# Patient Record
Sex: MALE | Race: WHITE | NOT HISPANIC OR LATINO | URBAN - METROPOLITAN AREA
[De-identification: names, ages, dates, MRNs, and addresses within clinical notes are randomized per-mention and may not be internally consistent; named-entity substitution may affect disease eponyms.]

---

## 2020-01-01 ENCOUNTER — INPATIENT (INPATIENT)
Facility: HOSPITAL | Age: 70
LOS: 3 days | DRG: 823 | End: 2020-08-20
Attending: HOSPITALIST | Admitting: OTOLARYNGOLOGY
Payer: MEDICARE

## 2020-01-01 VITALS
HEART RATE: 103 BPM | DIASTOLIC BLOOD PRESSURE: 70 MMHG | OXYGEN SATURATION: 99 % | SYSTOLIC BLOOD PRESSURE: 150 MMHG | RESPIRATION RATE: 17 BRPM

## 2020-01-01 VITALS — OXYGEN SATURATION: 51 %

## 2020-01-01 DIAGNOSIS — I21.4 NON-ST ELEVATION (NSTEMI) MYOCARDIAL INFARCTION: ICD-10-CM

## 2020-01-01 DIAGNOSIS — E11.65 TYPE 2 DIABETES MELLITUS WITH HYPERGLYCEMIA: ICD-10-CM

## 2020-01-01 DIAGNOSIS — I42.9 CARDIOMYOPATHY, UNSPECIFIED: ICD-10-CM

## 2020-01-01 DIAGNOSIS — B95.61 METHICILLIN SUSCEPTIBLE STAPHYLOCOCCUS AUREUS INFECTION AS THE CAUSE OF DISEASES CLASSIFIED ELSEWHERE: ICD-10-CM

## 2020-01-01 DIAGNOSIS — A41.9 SEPSIS, UNSPECIFIED ORGANISM: ICD-10-CM

## 2020-01-01 DIAGNOSIS — D63.0 ANEMIA IN NEOPLASTIC DISEASE: ICD-10-CM

## 2020-01-01 DIAGNOSIS — H93.19 TINNITUS, UNSPECIFIED EAR: ICD-10-CM

## 2020-01-01 DIAGNOSIS — I33.0 ACUTE AND SUBACUTE INFECTIVE ENDOCARDITIS: ICD-10-CM

## 2020-01-01 DIAGNOSIS — R65.21 SEVERE SEPSIS WITH SEPTIC SHOCK: ICD-10-CM

## 2020-01-01 DIAGNOSIS — R57.0 CARDIOGENIC SHOCK: ICD-10-CM

## 2020-01-01 DIAGNOSIS — C70.1 MALIGNANT NEOPLASM OF SPINAL MENINGES: ICD-10-CM

## 2020-01-01 DIAGNOSIS — G93.49 OTHER ENCEPHALOPATHY: ICD-10-CM

## 2020-01-01 DIAGNOSIS — I25.10 ATHEROSCLEROTIC HEART DISEASE OF NATIVE CORONARY ARTERY WITHOUT ANGINA PECTORIS: ICD-10-CM

## 2020-01-01 DIAGNOSIS — I67.89 OTHER CEREBROVASCULAR DISEASE: ICD-10-CM

## 2020-01-01 DIAGNOSIS — J32.9 CHRONIC SINUSITIS, UNSPECIFIED: ICD-10-CM

## 2020-01-01 DIAGNOSIS — I50.21 ACUTE SYSTOLIC (CONGESTIVE) HEART FAILURE: ICD-10-CM

## 2020-01-01 DIAGNOSIS — R00.0 TACHYCARDIA, UNSPECIFIED: ICD-10-CM

## 2020-01-01 DIAGNOSIS — C41.0 MALIGNANT NEOPLASM OF BONES OF SKULL AND FACE: ICD-10-CM

## 2020-01-01 DIAGNOSIS — E78.5 HYPERLIPIDEMIA, UNSPECIFIED: ICD-10-CM

## 2020-01-01 DIAGNOSIS — B96.4 PROTEUS (MIRABILIS) (MORGANII) AS THE CAUSE OF DISEASES CLASSIFIED ELSEWHERE: ICD-10-CM

## 2020-01-01 DIAGNOSIS — Z78.1 PHYSICAL RESTRAINT STATUS: ICD-10-CM
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
## 2020-01-01 DIAGNOSIS — I11.0 HYPERTENSIVE HEART DISEASE WITH HEART FAILURE: ICD-10-CM

## 2020-01-01 DIAGNOSIS — J96.01 ACUTE RESPIRATORY FAILURE WITH HYPOXIA: ICD-10-CM

## 2020-01-01 DIAGNOSIS — T81.43XA INFECTION FOLLOWING A PROCEDURE, ORGAN AND SPACE SURGICAL SITE, INITIAL ENCOUNTER: ICD-10-CM

## 2020-01-01 DIAGNOSIS — C85.11 UNSPECIFIED B-CELL LYMPHOMA, LYMPH NODES OF HEAD, FACE, AND NECK: ICD-10-CM
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
## 2020-01-01 LAB
-  AMPICILLIN/SULBACTAM: SIGNIFICANT CHANGE UP
-  AMPICILLIN: SIGNIFICANT CHANGE UP
-  CEFAZOLIN: SIGNIFICANT CHANGE UP
-  CEFAZOLIN: SIGNIFICANT CHANGE UP
-  CEFEPIME: SIGNIFICANT CHANGE UP
-  CEFTRIAXONE: SIGNIFICANT CHANGE UP
-  CIPROFLOXACIN: SIGNIFICANT CHANGE UP
-  CLINDAMYCIN: SIGNIFICANT CHANGE UP
-  ERYTHROMYCIN: SIGNIFICANT CHANGE UP
-  GENTAMICIN: SIGNIFICANT CHANGE UP
-  LINEZOLID: SIGNIFICANT CHANGE UP
-  OXACILLIN: SIGNIFICANT CHANGE UP
-  PIPERACILLIN/TAZOBACTAM: SIGNIFICANT CHANGE UP
-  RIFAMPIN: SIGNIFICANT CHANGE UP
-  TOBRAMYCIN: SIGNIFICANT CHANGE UP
-  TRIMETHOPRIM/SULFAMETHOXAZOLE: SIGNIFICANT CHANGE UP
-  TRIMETHOPRIM/SULFAMETHOXAZOLE: SIGNIFICANT CHANGE UP
-  VANCOMYCIN: SIGNIFICANT CHANGE UP
A1C WITH ESTIMATED AVERAGE GLUCOSE RESULT: 9.6 % — HIGH (ref 4–5.6)
A1C WITH ESTIMATED AVERAGE GLUCOSE RESULT: 9.7 % — HIGH (ref 4–5.6)
ALBUMIN SERPL ELPH-MCNC: 2 G/DL — LOW (ref 3.3–5)
ALBUMIN SERPL ELPH-MCNC: 2.1 G/DL — LOW (ref 3.3–5)
ALP SERPL-CCNC: 121 U/L — HIGH (ref 40–120)
ALP SERPL-CCNC: 124 U/L — HIGH (ref 40–120)
ALT FLD-CCNC: 1792 U/L — HIGH (ref 10–45)
ALT FLD-CCNC: 6134 U/L — HIGH (ref 10–45)
ANION GAP SERPL CALC-SCNC: 11 MMOL/L — SIGNIFICANT CHANGE UP (ref 5–17)
ANION GAP SERPL CALC-SCNC: 13 MMOL/L — SIGNIFICANT CHANGE UP (ref 5–17)
ANION GAP SERPL CALC-SCNC: 17 MMOL/L — SIGNIFICANT CHANGE UP (ref 5–17)
ANION GAP SERPL CALC-SCNC: 31 MMOL/L — HIGH (ref 5–17)
ANION GAP SERPL CALC-SCNC: 39 MMOL/L — HIGH (ref 5–17)
APTT BLD: 134.4 SEC — CRITICAL HIGH (ref 27.5–35.5)
APTT BLD: 32.7 SEC — SIGNIFICANT CHANGE UP (ref 27.5–35.5)
APTT BLD: 33.8 SEC — SIGNIFICANT CHANGE UP (ref 27.5–35.5)
APTT BLD: 70.5 SEC — HIGH (ref 27.5–35.5)
AST SERPL-CCNC: 2019 U/L — HIGH (ref 10–40)
AST SERPL-CCNC: >7000 U/L — HIGH (ref 10–40)
BASE EXCESS BLDA CALC-SCNC: -17.9 MMOL/L — LOW (ref -2–3)
BASE EXCESS BLDA CALC-SCNC: -20.8 MMOL/L — LOW (ref -2–3)
BASE EXCESS BLDMV CALC-SCNC: -16.2 MMOL/L — SIGNIFICANT CHANGE UP
BASE EXCESS BLDMV CALC-SCNC: -17.4 MMOL/L — SIGNIFICANT CHANGE UP
BASE EXCESS BLDMV CALC-SCNC: -19.9 MMOL/L — SIGNIFICANT CHANGE UP
BASOPHILS # BLD AUTO: 0.06 K/UL — SIGNIFICANT CHANGE UP (ref 0–0.2)
BASOPHILS # BLD AUTO: 0.38 K/UL — HIGH (ref 0–0.2)
BASOPHILS NFR BLD AUTO: 0.2 % — SIGNIFICANT CHANGE UP (ref 0–2)
BASOPHILS NFR BLD AUTO: 0.9 % — SIGNIFICANT CHANGE UP (ref 0–2)
BILIRUB SERPL-MCNC: 1 MG/DL — SIGNIFICANT CHANGE UP (ref 0.2–1.2)
BILIRUB SERPL-MCNC: 1.1 MG/DL — SIGNIFICANT CHANGE UP (ref 0.2–1.2)
BLD GP AB SCN SERPL QL: NEGATIVE — SIGNIFICANT CHANGE UP
BLD GP AB SCN SERPL QL: NEGATIVE — SIGNIFICANT CHANGE UP
BUN SERPL-MCNC: 10 MG/DL — SIGNIFICANT CHANGE UP (ref 7–23)
BUN SERPL-MCNC: 23 MG/DL — SIGNIFICANT CHANGE UP (ref 7–23)
BUN SERPL-MCNC: 24 MG/DL — HIGH (ref 7–23)
BUN SERPL-MCNC: 25 MG/DL — HIGH (ref 7–23)
BUN SERPL-MCNC: 9 MG/DL — SIGNIFICANT CHANGE UP (ref 7–23)
CALCIUM SERPL-MCNC: 7.5 MG/DL — LOW (ref 8.4–10.5)
CALCIUM SERPL-MCNC: 8.3 MG/DL — LOW (ref 8.4–10.5)
CALCIUM SERPL-MCNC: 8.7 MG/DL — SIGNIFICANT CHANGE UP (ref 8.4–10.5)
CALCIUM SERPL-MCNC: 8.8 MG/DL — SIGNIFICANT CHANGE UP (ref 8.4–10.5)
CALCIUM SERPL-MCNC: 9 MG/DL — SIGNIFICANT CHANGE UP (ref 8.4–10.5)
CHLORIDE SERPL-SCNC: 90 MMOL/L — LOW (ref 96–108)
CHLORIDE SERPL-SCNC: 94 MMOL/L — LOW (ref 96–108)
CHLORIDE SERPL-SCNC: 95 MMOL/L — LOW (ref 96–108)
CK MB CFR SERPL CALC: 26.6 NG/ML — HIGH (ref 0–6.7)
CK MB CFR SERPL CALC: 88.6 NG/ML — HIGH (ref 0–6.7)
CK SERPL-CCNC: 1305 U/L — HIGH (ref 30–200)
CK SERPL-CCNC: 317 U/L — HIGH (ref 30–200)
CO2 SERPL-SCNC: 19 MMOL/L — LOW (ref 22–31)
CO2 SERPL-SCNC: 23 MMOL/L — SIGNIFICANT CHANGE UP (ref 22–31)
CO2 SERPL-SCNC: 26 MMOL/L — SIGNIFICANT CHANGE UP (ref 22–31)
CO2 SERPL-SCNC: 8 MMOL/L — CRITICAL LOW (ref 22–31)
CO2 SERPL-SCNC: 9 MMOL/L — CRITICAL LOW (ref 22–31)
CREAT SERPL-MCNC: 0.82 MG/DL — SIGNIFICANT CHANGE UP (ref 0.5–1.3)
CREAT SERPL-MCNC: 0.86 MG/DL — SIGNIFICANT CHANGE UP (ref 0.5–1.3)
CREAT SERPL-MCNC: 1.46 MG/DL — HIGH (ref 0.5–1.3)
CREAT SERPL-MCNC: 1.97 MG/DL — HIGH (ref 0.5–1.3)
CREAT SERPL-MCNC: 2.14 MG/DL — HIGH (ref 0.5–1.3)
D DIMER BLD IA.RAPID-MCNC: 665 NG/ML DDU — HIGH
D DIMER BLD IA.RAPID-MCNC: HIGH NG/ML DDU
D DIMER BLD IA.RAPID-MCNC: HIGH NG/ML DDU
EOSINOPHIL # BLD AUTO: 0 K/UL — SIGNIFICANT CHANGE UP (ref 0–0.5)
EOSINOPHIL # BLD AUTO: 0.01 K/UL — SIGNIFICANT CHANGE UP (ref 0–0.5)
EOSINOPHIL NFR BLD AUTO: 0 % — SIGNIFICANT CHANGE UP (ref 0–6)
EOSINOPHIL NFR BLD AUTO: 0 % — SIGNIFICANT CHANGE UP (ref 0–6)
ESTIMATED AVERAGE GLUCOSE: 229 MG/DL — HIGH (ref 68–114)
ESTIMATED AVERAGE GLUCOSE: 232 MG/DL — HIGH (ref 68–114)
FIBRINOGEN PPP-MCNC: 265 MG/DL — SIGNIFICANT CHANGE UP (ref 258–438)
GAS PNL BLDA: SIGNIFICANT CHANGE UP
GAS PNL BLDMV: SIGNIFICANT CHANGE UP
GLUCOSE BLDC GLUCOMTR-MCNC: 141 MG/DL — HIGH (ref 70–99)
GLUCOSE BLDC GLUCOMTR-MCNC: 157 MG/DL — HIGH (ref 70–99)
GLUCOSE BLDC GLUCOMTR-MCNC: 186 MG/DL — HIGH (ref 70–99)
GLUCOSE BLDC GLUCOMTR-MCNC: 206 MG/DL — HIGH (ref 70–99)
GLUCOSE BLDC GLUCOMTR-MCNC: 222 MG/DL — HIGH (ref 70–99)
GLUCOSE BLDC GLUCOMTR-MCNC: 235 MG/DL — HIGH (ref 70–99)
GLUCOSE BLDC GLUCOMTR-MCNC: 240 MG/DL — HIGH (ref 70–99)
GLUCOSE BLDC GLUCOMTR-MCNC: 241 MG/DL — HIGH (ref 70–99)
GLUCOSE BLDC GLUCOMTR-MCNC: 249 MG/DL — HIGH (ref 70–99)
GLUCOSE BLDC GLUCOMTR-MCNC: 256 MG/DL — HIGH (ref 70–99)
GLUCOSE BLDC GLUCOMTR-MCNC: 262 MG/DL — HIGH (ref 70–99)
GLUCOSE BLDC GLUCOMTR-MCNC: 297 MG/DL — HIGH (ref 70–99)
GLUCOSE BLDC GLUCOMTR-MCNC: 309 MG/DL — HIGH (ref 70–99)
GLUCOSE BLDC GLUCOMTR-MCNC: 320 MG/DL — HIGH (ref 70–99)
GLUCOSE BLDC GLUCOMTR-MCNC: 324 MG/DL — HIGH (ref 70–99)
GLUCOSE BLDC GLUCOMTR-MCNC: 325 MG/DL — HIGH (ref 70–99)
GLUCOSE BLDC GLUCOMTR-MCNC: 325 MG/DL — HIGH (ref 70–99)
GLUCOSE BLDC GLUCOMTR-MCNC: 326 MG/DL — HIGH (ref 70–99)
GLUCOSE BLDC GLUCOMTR-MCNC: 361 MG/DL — HIGH (ref 70–99)
GLUCOSE BLDC GLUCOMTR-MCNC: 389 MG/DL — HIGH (ref 70–99)
GLUCOSE BLDC GLUCOMTR-MCNC: 396 MG/DL — HIGH (ref 70–99)
GLUCOSE BLDC GLUCOMTR-MCNC: 88 MG/DL — SIGNIFICANT CHANGE UP (ref 70–99)
GLUCOSE SERPL-MCNC: 152 MG/DL — HIGH (ref 70–99)
GLUCOSE SERPL-MCNC: 224 MG/DL — HIGH (ref 70–99)
GLUCOSE SERPL-MCNC: 227 MG/DL — HIGH (ref 70–99)
GLUCOSE SERPL-MCNC: 233 MG/DL — HIGH (ref 70–99)
GLUCOSE SERPL-MCNC: 336 MG/DL — HIGH (ref 70–99)
GRAM STN FLD: SIGNIFICANT CHANGE UP
GRAM STN FLD: SIGNIFICANT CHANGE UP
HCO3 BLDA-SCNC: 7 MMOL/L — CRITICAL LOW (ref 21–28)
HCO3 BLDA-SCNC: 9 MMOL/L — CRITICAL LOW (ref 21–28)
HCO3 BLDMV-SCNC: 10 MMOL/L — SIGNIFICANT CHANGE UP
HCO3 BLDMV-SCNC: 12 MMOL/L — SIGNIFICANT CHANGE UP
HCO3 BLDMV-SCNC: 12 MMOL/L — SIGNIFICANT CHANGE UP
HCT VFR BLD CALC: 23.4 % — LOW (ref 39–50)
HCT VFR BLD CALC: 28.7 % — LOW (ref 39–50)
HCT VFR BLD CALC: 29.4 % — LOW (ref 39–50)
HCT VFR BLD CALC: 29.6 % — LOW (ref 39–50)
HCV AB S/CO SERPL IA: 0.05 S/CO — SIGNIFICANT CHANGE UP
HCV AB SERPL-IMP: SIGNIFICANT CHANGE UP
HGB BLD-MCNC: 7.3 G/DL — LOW (ref 13–17)
HGB BLD-MCNC: 9.4 G/DL — LOW (ref 13–17)
HGB BLD-MCNC: 9.6 G/DL — LOW (ref 13–17)
HGB BLD-MCNC: 9.7 G/DL — LOW (ref 13–17)
IMM GRANULOCYTES NFR BLD AUTO: 1.2 % — SIGNIFICANT CHANGE UP (ref 0–1.5)
INR BLD: 1.33 — HIGH (ref 0.88–1.16)
INR BLD: 1.42 — HIGH (ref 0.88–1.16)
INR BLD: 2.3 — HIGH (ref 0.88–1.16)
LACTATE SERPL-SCNC: 2.6 MMOL/L — HIGH (ref 0.5–2)
LACTATE SERPL-SCNC: 23 MMOL/L — CRITICAL HIGH (ref 0.5–2)
LACTATE SERPL-SCNC: >25 MMOL/L — CRITICAL HIGH (ref 0.5–2)
LACTATE SERPL-SCNC: >25 MMOL/L — CRITICAL HIGH (ref 0.5–2)
LDH SERPL L TO P-CCNC: HIGH U/L (ref 50–242)
LYMPHOCYTES # BLD AUTO: 13.7 % — SIGNIFICANT CHANGE UP (ref 13–44)
LYMPHOCYTES # BLD AUTO: 20 % — SIGNIFICANT CHANGE UP (ref 13–44)
LYMPHOCYTES # BLD AUTO: 3.29 K/UL — SIGNIFICANT CHANGE UP (ref 1–3.3)
LYMPHOCYTES # BLD AUTO: 8.37 K/UL — HIGH (ref 1–3.3)
MAGNESIUM SERPL-MCNC: 1.3 MG/DL — LOW (ref 1.6–2.6)
MAGNESIUM SERPL-MCNC: 1.4 MG/DL — LOW (ref 1.6–2.6)
MAGNESIUM SERPL-MCNC: 1.9 MG/DL — SIGNIFICANT CHANGE UP (ref 1.6–2.6)
MCHC RBC-ENTMCNC: 26.8 PG — LOW (ref 27–34)
MCHC RBC-ENTMCNC: 27 PG — SIGNIFICANT CHANGE UP (ref 27–34)
MCHC RBC-ENTMCNC: 27.7 PG — SIGNIFICANT CHANGE UP (ref 27–34)
MCHC RBC-ENTMCNC: 27.8 PG — SIGNIFICANT CHANGE UP (ref 27–34)
MCHC RBC-ENTMCNC: 31.2 GM/DL — LOW (ref 32–36)
MCHC RBC-ENTMCNC: 32.7 GM/DL — SIGNIFICANT CHANGE UP (ref 32–36)
MCHC RBC-ENTMCNC: 32.8 GM/DL — SIGNIFICANT CHANGE UP (ref 32–36)
MCHC RBC-ENTMCNC: 32.8 GM/DL — SIGNIFICANT CHANGE UP (ref 32–36)
MCV RBC AUTO: 81.8 FL — SIGNIFICANT CHANGE UP (ref 80–100)
MCV RBC AUTO: 82.6 FL — SIGNIFICANT CHANGE UP (ref 80–100)
MCV RBC AUTO: 84.7 FL — SIGNIFICANT CHANGE UP (ref 80–100)
MCV RBC AUTO: 89 FL — SIGNIFICANT CHANGE UP (ref 80–100)
METHOD TYPE: SIGNIFICANT CHANGE UP
METHOD TYPE: SIGNIFICANT CHANGE UP
MONOCYTES # BLD AUTO: 0.71 K/UL — SIGNIFICANT CHANGE UP (ref 0–0.9)
MONOCYTES # BLD AUTO: 1.41 K/UL — HIGH (ref 0–0.9)
MONOCYTES NFR BLD AUTO: 1.7 % — LOW (ref 2–14)
MONOCYTES NFR BLD AUTO: 5.9 % — SIGNIFICANT CHANGE UP (ref 2–14)
MRSA PCR RESULT.: NEGATIVE — SIGNIFICANT CHANGE UP
NEUTROPHILS # BLD AUTO: 19.02 K/UL — HIGH (ref 1.8–7.4)
NEUTROPHILS # BLD AUTO: 32.02 K/UL — HIGH (ref 1.8–7.4)
NEUTROPHILS NFR BLD AUTO: 73 % — SIGNIFICANT CHANGE UP (ref 43–77)
NEUTROPHILS NFR BLD AUTO: 79 % — HIGH (ref 43–77)
NRBC # BLD: 0 /100 WBCS — SIGNIFICANT CHANGE UP (ref 0–0)
NT-PROBNP SERPL-SCNC: 5975 PG/ML — HIGH (ref 0–300)
O2 CT VFR BLD CALC: 39 MMHG — SIGNIFICANT CHANGE UP (ref 30–65)
O2 CT VFR BLD CALC: 40 MMHG — SIGNIFICANT CHANGE UP (ref 30–65)
O2 CT VFR BLD CALC: 42 MMHG — SIGNIFICANT CHANGE UP (ref 30–65)
PCO2 BLDA: 25 MMHG — LOW (ref 35–48)
PCO2 BLDA: 26 MMHG — LOW (ref 35–48)
PCO2 BLDMV: 36 MMHG — SIGNIFICANT CHANGE UP (ref 30–65)
PCO2 BLDMV: 39 MMHG — SIGNIFICANT CHANGE UP (ref 30–65)
PCO2 BLDMV: 44 MMHG — SIGNIFICANT CHANGE UP (ref 30–65)
PH BLDA: 7.09 — CRITICAL LOW (ref 7.35–7.45)
PH BLDA: 7.17 — CRITICAL LOW (ref 7.35–7.45)
PH BLDMV: 7.04 — LOW (ref 7.2–7.45)
PH BLDMV: 7.05 — LOW (ref 7.2–7.45)
PH BLDMV: 7.11 — LOW (ref 7.2–7.45)
PHOSPHATE SERPL-MCNC: 3.3 MG/DL — SIGNIFICANT CHANGE UP (ref 2.5–4.5)
PHOSPHATE SERPL-MCNC: 3.4 MG/DL — SIGNIFICANT CHANGE UP (ref 2.5–4.5)
PHOSPHATE SERPL-MCNC: 9.1 MG/DL — HIGH (ref 2.5–4.5)
PLATELET # BLD AUTO: 175 K/UL — SIGNIFICANT CHANGE UP (ref 150–400)
PLATELET # BLD AUTO: 454 K/UL — HIGH (ref 150–400)
PLATELET # BLD AUTO: 458 K/UL — HIGH (ref 150–400)
PLATELET # BLD AUTO: 507 K/UL — HIGH (ref 150–400)
PO2 BLDA: 110 MMHG — HIGH (ref 83–108)
PO2 BLDA: 66 MMHG — LOW (ref 83–108)
POTASSIUM SERPL-MCNC: 4.7 MMOL/L — SIGNIFICANT CHANGE UP (ref 3.5–5.3)
POTASSIUM SERPL-MCNC: 4.8 MMOL/L — SIGNIFICANT CHANGE UP (ref 3.5–5.3)
POTASSIUM SERPL-MCNC: 5.1 MMOL/L — SIGNIFICANT CHANGE UP (ref 3.5–5.3)
POTASSIUM SERPL-MCNC: 5.4 MMOL/L — HIGH (ref 3.5–5.3)
POTASSIUM SERPL-MCNC: 5.4 MMOL/L — HIGH (ref 3.5–5.3)
POTASSIUM SERPL-SCNC: 4.7 MMOL/L — SIGNIFICANT CHANGE UP (ref 3.5–5.3)
POTASSIUM SERPL-SCNC: 4.8 MMOL/L — SIGNIFICANT CHANGE UP (ref 3.5–5.3)
POTASSIUM SERPL-SCNC: 5.1 MMOL/L — SIGNIFICANT CHANGE UP (ref 3.5–5.3)
POTASSIUM SERPL-SCNC: 5.4 MMOL/L — HIGH (ref 3.5–5.3)
POTASSIUM SERPL-SCNC: 5.4 MMOL/L — HIGH (ref 3.5–5.3)
PROT SERPL-MCNC: 4.3 G/DL — LOW (ref 6–8.3)
PROT SERPL-MCNC: 4.9 G/DL — LOW (ref 6–8.3)
PROTHROM AB SERPL-ACNC: 15.7 SEC — HIGH (ref 10.6–13.6)
PROTHROM AB SERPL-ACNC: 16.8 SEC — HIGH (ref 10.6–13.6)
PROTHROM AB SERPL-ACNC: 26.5 SEC — HIGH (ref 10.6–13.6)
RBC # BLD: 2.63 M/UL — LOW (ref 4.2–5.8)
RBC # BLD: 3.39 M/UL — LOW (ref 4.2–5.8)
RBC # BLD: 3.56 M/UL — LOW (ref 4.2–5.8)
RBC # BLD: 3.62 M/UL — LOW (ref 4.2–5.8)
RBC # FLD: 12.9 % — SIGNIFICANT CHANGE UP (ref 10.3–14.5)
RBC # FLD: 13.1 % — SIGNIFICANT CHANGE UP (ref 10.3–14.5)
RBC # FLD: 13.2 % — SIGNIFICANT CHANGE UP (ref 10.3–14.5)
RBC # FLD: 13.3 % — SIGNIFICANT CHANGE UP (ref 10.3–14.5)
RH IG SCN BLD-IMP: POSITIVE — SIGNIFICANT CHANGE UP
RH IG SCN BLD-IMP: POSITIVE — SIGNIFICANT CHANGE UP
S AUREUS DNA NOSE QL NAA+PROBE: POSITIVE
SAO2 % BLDA: 88 % — LOW (ref 95–100)
SAO2 % BLDA: 96 % — SIGNIFICANT CHANGE UP (ref 95–100)
SAO2 % BLDMV: 45 % — SIGNIFICANT CHANGE UP
SAO2 % BLDMV: 51 % — SIGNIFICANT CHANGE UP
SAO2 % BLDMV: 58 % — SIGNIFICANT CHANGE UP
SARS-COV-2 RNA SPEC QL NAA+PROBE: SIGNIFICANT CHANGE UP
SODIUM SERPL-SCNC: 126 MMOL/L — LOW (ref 135–145)
SODIUM SERPL-SCNC: 130 MMOL/L — LOW (ref 135–145)
SODIUM SERPL-SCNC: 131 MMOL/L — LOW (ref 135–145)
SODIUM SERPL-SCNC: 135 MMOL/L — SIGNIFICANT CHANGE UP (ref 135–145)
SODIUM SERPL-SCNC: 141 MMOL/L — SIGNIFICANT CHANGE UP (ref 135–145)
SPECIMEN SOURCE: SIGNIFICANT CHANGE UP
SPECIMEN SOURCE: SIGNIFICANT CHANGE UP
TROPONIN T SERPL-MCNC: 0.75 NG/ML — CRITICAL HIGH (ref 0–0.01)
TROPONIN T SERPL-MCNC: 9.14 NG/ML — CRITICAL HIGH (ref 0–0.01)
VANCOMYCIN TROUGH SERPL-MCNC: 15.7 UG/ML — SIGNIFICANT CHANGE UP (ref 10–20)
WBC # BLD: 13.34 K/UL — HIGH (ref 3.8–10.5)
WBC # BLD: 13.68 K/UL — HIGH (ref 3.8–10.5)
WBC # BLD: 24.09 K/UL — HIGH (ref 3.8–10.5)
WBC # BLD: 38.47 K/UL — HIGH (ref 3.8–10.5)
WBC # FLD AUTO: 13.34 K/UL — HIGH (ref 3.8–10.5)
WBC # FLD AUTO: 13.68 K/UL — HIGH (ref 3.8–10.5)
WBC # FLD AUTO: 24.09 K/UL — HIGH (ref 3.8–10.5)
WBC # FLD AUTO: 38.47 K/UL — HIGH (ref 3.8–10.5)

## 2020-01-01 PROCEDURE — 88331 PATH CONSLTJ SURG 1 BLK 1SPC: CPT | Mod: 26

## 2020-01-01 PROCEDURE — 88307 TISSUE EXAM BY PATHOLOGIST: CPT | Mod: 26

## 2020-01-01 PROCEDURE — 93010 ELECTROCARDIOGRAM REPORT: CPT

## 2020-01-01 PROCEDURE — 88341 IMHCHEM/IMCYTCHM EA ADD ANTB: CPT | Mod: 26,59

## 2020-01-01 PROCEDURE — 88311 DECALCIFY TISSUE: CPT | Mod: 26

## 2020-01-01 PROCEDURE — 99291 CRITICAL CARE FIRST HOUR: CPT

## 2020-01-01 PROCEDURE — 99223 1ST HOSP IP/OBS HIGH 75: CPT

## 2020-01-01 PROCEDURE — 93306 TTE W/DOPPLER COMPLETE: CPT | Mod: 26

## 2020-01-01 PROCEDURE — 71045 X-RAY EXAM CHEST 1 VIEW: CPT | Mod: 26

## 2020-01-01 PROCEDURE — 99292 CRITICAL CARE ADDL 30 MIN: CPT

## 2020-01-01 PROCEDURE — 93308 TTE F-UP OR LMTD: CPT | Mod: 26

## 2020-01-01 PROCEDURE — 93456 R HRT CORONARY ARTERY ANGIO: CPT | Mod: 26,59

## 2020-01-01 PROCEDURE — 70450 CT HEAD/BRAIN W/O DYE: CPT | Mod: 26

## 2020-01-01 PROCEDURE — 99239 HOSP IP/OBS DSCHRG MGMT >30: CPT

## 2020-01-01 PROCEDURE — 88365 INSITU HYBRIDIZATION (FISH): CPT | Mod: 26

## 2020-01-01 PROCEDURE — 99233 SBSQ HOSP IP/OBS HIGH 50: CPT | Mod: GC

## 2020-01-01 PROCEDURE — 92928 PRQ TCAT PLMT NTRAC ST 1 LES: CPT | Mod: LM

## 2020-01-01 PROCEDURE — 88305 TISSUE EXAM BY PATHOLOGIST: CPT | Mod: 26

## 2020-01-01 PROCEDURE — 70542 MRI ORBIT/FACE/NECK W/DYE: CPT | Mod: 26

## 2020-01-01 PROCEDURE — 88360 TUMOR IMMUNOHISTOCHEM/MANUAL: CPT | Mod: 26

## 2020-01-01 PROCEDURE — 33990 INSJ PERQ VAD L HRT ARTERIAL: CPT

## 2020-01-01 PROCEDURE — 88342 IMHCHEM/IMCYTCHM 1ST ANTB: CPT | Mod: 26,59

## 2020-01-01 PROCEDURE — 88304 TISSUE EXAM BY PATHOLOGIST: CPT | Mod: 26

## 2020-01-01 RX ORDER — HEPARIN SODIUM 5000 [USP'U]/ML
1400 INJECTION INTRAVENOUS; SUBCUTANEOUS
Qty: 25000 | Refills: 0 | Status: DISCONTINUED | OUTPATIENT
Start: 2020-01-01 | End: 2020-01-01

## 2020-01-01 RX ORDER — VANCOMYCIN HCL 1 G
1500 VIAL (EA) INTRAVENOUS ONCE
Refills: 0 | Status: COMPLETED | OUTPATIENT
Start: 2020-01-01 | End: 2020-01-01

## 2020-01-01 RX ORDER — CANGRELOR 50 MG/1
3.89 INJECTION, POWDER, LYOPHILIZED, FOR SOLUTION INTRAVENOUS
Qty: 50 | Refills: 0 | Status: DISCONTINUED | OUTPATIENT
Start: 2020-01-01 | End: 2020-01-01

## 2020-01-01 RX ORDER — DEXTROSE 50 % IN WATER 50 %
15 SYRINGE (ML) INTRAVENOUS ONCE
Refills: 0 | Status: DISCONTINUED | OUTPATIENT
Start: 2020-01-01 | End: 2020-01-01

## 2020-01-01 RX ORDER — TICAGRELOR 90 MG/1
180 TABLET ORAL ONCE
Refills: 0 | Status: COMPLETED | OUTPATIENT
Start: 2020-01-01 | End: 2020-01-01

## 2020-01-01 RX ORDER — SODIUM BICARBONATE 1 MEQ/ML
50 SYRINGE (ML) INTRAVENOUS ONCE
Refills: 0 | Status: COMPLETED | OUTPATIENT
Start: 2020-01-01 | End: 2020-01-01

## 2020-01-01 RX ORDER — IBUPROFEN 200 MG
400 TABLET ORAL EVERY 6 HOURS
Refills: 0 | Status: DISCONTINUED | OUTPATIENT
Start: 2020-01-01 | End: 2020-01-01

## 2020-01-01 RX ORDER — INSULIN HUMAN 100 [IU]/ML
3 INJECTION, SOLUTION SUBCUTANEOUS
Qty: 100 | Refills: 0 | Status: DISCONTINUED | OUTPATIENT
Start: 2020-01-01 | End: 2020-01-01

## 2020-01-01 RX ORDER — LISINOPRIL 2.5 MG/1
10 TABLET ORAL
Refills: 0 | Status: DISCONTINUED | OUTPATIENT
Start: 2020-01-01 | End: 2020-01-01

## 2020-01-01 RX ORDER — HEPARIN SODIUM 5000 [USP'U]/ML
INJECTION INTRAVENOUS; SUBCUTANEOUS
Qty: 12500 | Refills: 0 | Status: DISCONTINUED | OUTPATIENT
Start: 2020-01-01 | End: 2020-01-01

## 2020-01-01 RX ORDER — EPINEPHRINE 0.3 MG/.3ML
2 INJECTION INTRAMUSCULAR; SUBCUTANEOUS
Qty: 4 | Refills: 0 | Status: DISCONTINUED | OUTPATIENT
Start: 2020-01-01 | End: 2020-01-01

## 2020-01-01 RX ORDER — AMIODARONE HYDROCHLORIDE 400 MG/1
1 TABLET ORAL
Qty: 900 | Refills: 0 | Status: DISCONTINUED | OUTPATIENT
Start: 2020-01-01 | End: 2020-01-01

## 2020-01-01 RX ORDER — FENTANYL CITRATE 50 UG/ML
50 INJECTION INTRAVENOUS ONCE
Refills: 0 | Status: DISCONTINUED | OUTPATIENT
Start: 2020-01-01 | End: 2020-01-01

## 2020-01-01 RX ORDER — INSULIN LISPRO 100/ML
VIAL (ML) SUBCUTANEOUS
Refills: 0 | Status: DISCONTINUED | OUTPATIENT
Start: 2020-01-01 | End: 2020-01-01

## 2020-01-01 RX ORDER — FENTANYL CITRATE 50 UG/ML
0.5 INJECTION INTRAVENOUS
Qty: 2500 | Refills: 0 | Status: DISCONTINUED | OUTPATIENT
Start: 2020-01-01 | End: 2020-01-01

## 2020-01-01 RX ORDER — DEXTROSE 50 % IN WATER 50 %
25 SYRINGE (ML) INTRAVENOUS ONCE
Refills: 0 | Status: DISCONTINUED | OUTPATIENT
Start: 2020-01-01 | End: 2020-01-01

## 2020-01-01 RX ORDER — SODIUM CHLORIDE 0.65 %
2 AEROSOL, SPRAY (ML) NASAL
Refills: 0 | Status: DISCONTINUED | OUTPATIENT
Start: 2020-01-01 | End: 2020-01-01

## 2020-01-01 RX ORDER — SODIUM CHLORIDE 9 MG/ML
1000 INJECTION, SOLUTION INTRAVENOUS
Refills: 0 | Status: DISCONTINUED | OUTPATIENT
Start: 2020-01-01 | End: 2020-01-01

## 2020-01-01 RX ORDER — CEFEPIME 1 G/1
2000 INJECTION, POWDER, FOR SOLUTION INTRAMUSCULAR; INTRAVENOUS EVERY 8 HOURS
Refills: 0 | Status: DISCONTINUED | OUTPATIENT
Start: 2020-01-01 | End: 2020-01-01

## 2020-01-01 RX ORDER — FENTANYL CITRATE 50 UG/ML
25 INJECTION INTRAVENOUS ONCE
Refills: 0 | Status: DISCONTINUED | OUTPATIENT
Start: 2020-01-01 | End: 2020-01-01

## 2020-01-01 RX ORDER — NOREPINEPHRINE BITARTRATE/D5W 8 MG/250ML
0.05 PLASTIC BAG, INJECTION (ML) INTRAVENOUS
Qty: 8 | Refills: 0 | Status: DISCONTINUED | OUTPATIENT
Start: 2020-01-01 | End: 2020-01-01

## 2020-01-01 RX ORDER — SODIUM CHLORIDE 9 MG/ML
250 INJECTION INTRAMUSCULAR; INTRAVENOUS; SUBCUTANEOUS ONCE
Refills: 0 | Status: DISCONTINUED | OUTPATIENT
Start: 2020-01-01 | End: 2020-01-01

## 2020-01-01 RX ORDER — SODIUM CHLORIDE 9 MG/ML
500 INJECTION, SOLUTION INTRAVENOUS ONCE
Refills: 0 | Status: DISCONTINUED | OUTPATIENT
Start: 2020-01-01 | End: 2020-01-01

## 2020-01-01 RX ORDER — SODIUM BICARBONATE 1 MEQ/ML
0.41 SYRINGE (ML) INTRAVENOUS
Qty: 150 | Refills: 0 | Status: DISCONTINUED | OUTPATIENT
Start: 2020-01-01 | End: 2020-01-01

## 2020-01-01 RX ORDER — CLOPIDOGREL BISULFATE 75 MG/1
600 TABLET, FILM COATED ORAL ONCE
Refills: 0 | Status: DISCONTINUED | OUTPATIENT
Start: 2020-01-01 | End: 2020-01-01

## 2020-01-01 RX ORDER — MEROPENEM 1 G/30ML
2000 INJECTION INTRAVENOUS EVERY 8 HOURS
Refills: 0 | Status: DISCONTINUED | OUTPATIENT
Start: 2020-01-01 | End: 2020-01-01

## 2020-01-01 RX ORDER — PANTOPRAZOLE SODIUM 20 MG/1
80 TABLET, DELAYED RELEASE ORAL ONCE
Refills: 0 | Status: COMPLETED | OUTPATIENT
Start: 2020-01-01 | End: 2020-01-01

## 2020-01-01 RX ORDER — VANCOMYCIN HCL 1 G
1500 VIAL (EA) INTRAVENOUS EVERY 12 HOURS
Refills: 0 | Status: DISCONTINUED | OUTPATIENT
Start: 2020-01-01 | End: 2020-01-01

## 2020-01-01 RX ORDER — HEPARIN SODIUM 5000 [USP'U]/ML
2500 INJECTION INTRAVENOUS; SUBCUTANEOUS
Qty: 25000 | Refills: 0 | Status: DISCONTINUED | OUTPATIENT
Start: 2020-01-01 | End: 2020-01-01

## 2020-01-01 RX ORDER — LABETALOL HCL 100 MG
10 TABLET ORAL
Refills: 0 | Status: DISCONTINUED | OUTPATIENT
Start: 2020-01-01 | End: 2020-01-01

## 2020-01-01 RX ORDER — HEPARIN SODIUM 5000 [USP'U]/ML
1000 INJECTION INTRAVENOUS; SUBCUTANEOUS
Qty: 25000 | Refills: 0 | Status: DISCONTINUED | OUTPATIENT
Start: 2020-01-01 | End: 2020-01-01

## 2020-01-01 RX ORDER — SODIUM BICARBONATE 1 MEQ/ML
150 SYRINGE (ML) INTRAVENOUS ONCE
Refills: 0 | Status: COMPLETED | OUTPATIENT
Start: 2020-01-01 | End: 2020-01-01

## 2020-01-01 RX ORDER — DIPHENHYDRAMINE HCL 50 MG
25 CAPSULE ORAL AT BEDTIME
Refills: 0 | Status: DISCONTINUED | OUTPATIENT
Start: 2020-01-01 | End: 2020-01-01

## 2020-01-01 RX ORDER — SODIUM BICARBONATE 1 MEQ/ML
2 SYRINGE (ML) INTRAVENOUS
Qty: 150 | Refills: 0 | Status: DISCONTINUED | OUTPATIENT
Start: 2020-01-01 | End: 2020-01-01

## 2020-01-01 RX ORDER — ASPIRIN/CALCIUM CARB/MAGNESIUM 324 MG
81 TABLET ORAL DAILY
Refills: 0 | Status: DISCONTINUED | OUTPATIENT
Start: 2020-01-01 | End: 2020-01-01

## 2020-01-01 RX ORDER — SODIUM CHLORIDE 9 MG/ML
1000 INJECTION, SOLUTION INTRAVENOUS ONCE
Refills: 0 | Status: DISCONTINUED | OUTPATIENT
Start: 2020-01-01 | End: 2020-01-01

## 2020-01-01 RX ORDER — ASPIRIN/CALCIUM CARB/MAGNESIUM 324 MG
325 TABLET ORAL ONCE
Refills: 0 | Status: COMPLETED | OUTPATIENT
Start: 2020-01-01 | End: 2020-01-01

## 2020-01-01 RX ORDER — DIAZEPAM 5 MG
2 TABLET ORAL EVERY 6 HOURS
Refills: 0 | Status: DISCONTINUED | OUTPATIENT
Start: 2020-01-01 | End: 2020-01-01

## 2020-01-01 RX ORDER — PROPOFOL 10 MG/ML
10 INJECTION, EMULSION INTRAVENOUS
Qty: 1000 | Refills: 0 | Status: DISCONTINUED | OUTPATIENT
Start: 2020-01-01 | End: 2020-01-01

## 2020-01-01 RX ORDER — NOREPINEPHRINE BITARTRATE/D5W 8 MG/250ML
0.05 PLASTIC BAG, INJECTION (ML) INTRAVENOUS
Qty: 32 | Refills: 0 | Status: DISCONTINUED | OUTPATIENT
Start: 2020-01-01 | End: 2020-01-01

## 2020-01-01 RX ORDER — SODIUM BICARBONATE 1 MEQ/ML
50 SYRINGE (ML) INTRAVENOUS
Refills: 0 | Status: COMPLETED | OUTPATIENT
Start: 2020-01-01 | End: 2020-01-01

## 2020-01-01 RX ORDER — MEROPENEM 1 G/30ML
2000 INJECTION INTRAVENOUS EVERY 12 HOURS
Refills: 0 | Status: DISCONTINUED | OUTPATIENT
Start: 2020-01-01 | End: 2020-01-01

## 2020-01-01 RX ORDER — SODIUM CHLORIDE 9 MG/ML
1000 INJECTION, SOLUTION INTRAVENOUS ONCE
Refills: 0 | Status: COMPLETED | OUTPATIENT
Start: 2020-01-01 | End: 2020-01-01

## 2020-01-01 RX ORDER — ACETAMINOPHEN 500 MG
650 TABLET ORAL EVERY 6 HOURS
Refills: 0 | Status: DISCONTINUED | OUTPATIENT
Start: 2020-01-01 | End: 2020-01-01

## 2020-01-01 RX ORDER — CHLORHEXIDINE GLUCONATE 213 G/1000ML
15 SOLUTION TOPICAL EVERY 12 HOURS
Refills: 0 | Status: DISCONTINUED | OUTPATIENT
Start: 2020-01-01 | End: 2020-01-01

## 2020-01-01 RX ORDER — VASOPRESSIN 20 [USP'U]/ML
0.04 INJECTION INTRAVENOUS
Qty: 50 | Refills: 0 | Status: DISCONTINUED | OUTPATIENT
Start: 2020-01-01 | End: 2020-01-01

## 2020-01-01 RX ORDER — TRAMADOL HYDROCHLORIDE 50 MG/1
50 TABLET ORAL EVERY 6 HOURS
Refills: 0 | Status: DISCONTINUED | OUTPATIENT
Start: 2020-01-01 | End: 2020-01-01

## 2020-01-01 RX ORDER — METRONIDAZOLE 500 MG
500 TABLET ORAL EVERY 8 HOURS
Refills: 0 | Status: DISCONTINUED | OUTPATIENT
Start: 2020-01-01 | End: 2020-01-01

## 2020-01-01 RX ORDER — PANTOPRAZOLE SODIUM 20 MG/1
40 TABLET, DELAYED RELEASE ORAL
Refills: 0 | Status: DISCONTINUED | OUTPATIENT
Start: 2020-01-01 | End: 2020-01-01

## 2020-01-01 RX ORDER — INSULIN GLARGINE 100 [IU]/ML
10 INJECTION, SOLUTION SUBCUTANEOUS AT BEDTIME
Refills: 0 | Status: DISCONTINUED | OUTPATIENT
Start: 2020-01-01 | End: 2020-01-01

## 2020-01-01 RX ORDER — ONDANSETRON 8 MG/1
4 TABLET, FILM COATED ORAL EVERY 6 HOURS
Refills: 0 | Status: DISCONTINUED | OUTPATIENT
Start: 2020-01-01 | End: 2020-01-01

## 2020-01-01 RX ORDER — LANOLIN ALCOHOL/MO/W.PET/CERES
1 CREAM (GRAM) TOPICAL AT BEDTIME
Refills: 0 | Status: DISCONTINUED | OUTPATIENT
Start: 2020-01-01 | End: 2020-01-01

## 2020-01-01 RX ORDER — CHLORHEXIDINE GLUCONATE 213 G/1000ML
1 SOLUTION TOPICAL
Refills: 0 | Status: DISCONTINUED | OUTPATIENT
Start: 2020-01-01 | End: 2020-01-01

## 2020-01-01 RX ORDER — VASOPRESSIN 20 [USP'U]/ML
0 INJECTION INTRAVENOUS
Qty: 50 | Refills: 0 | Status: DISCONTINUED | OUTPATIENT
Start: 2020-01-01 | End: 2020-01-01

## 2020-01-01 RX ORDER — TICAGRELOR 90 MG/1
90 TABLET ORAL EVERY 12 HOURS
Refills: 0 | Status: DISCONTINUED | OUTPATIENT
Start: 2020-01-01 | End: 2020-01-01

## 2020-01-01 RX ORDER — MEROPENEM 1 G/30ML
INJECTION INTRAVENOUS
Refills: 0 | Status: DISCONTINUED | OUTPATIENT
Start: 2020-01-01 | End: 2020-01-01

## 2020-01-01 RX ORDER — OXYCODONE HYDROCHLORIDE 5 MG/1
5 TABLET ORAL EVERY 4 HOURS
Refills: 0 | Status: DISCONTINUED | OUTPATIENT
Start: 2020-01-01 | End: 2020-01-01

## 2020-01-01 RX ORDER — SODIUM BICARBONATE 1 MEQ/ML
50 SYRINGE (ML) INTRAVENOUS ONCE
Refills: 0 | Status: DISCONTINUED | OUTPATIENT
Start: 2020-01-01 | End: 2020-01-01

## 2020-01-01 RX ORDER — MAGNESIUM SULFATE 500 MG/ML
1 VIAL (ML) INJECTION ONCE
Refills: 0 | Status: COMPLETED | OUTPATIENT
Start: 2020-01-01 | End: 2020-01-01

## 2020-01-01 RX ORDER — EPINEPHRINE 0.3 MG/.3ML
2 INJECTION INTRAMUSCULAR; SUBCUTANEOUS
Qty: 16 | Refills: 0 | Status: DISCONTINUED | OUTPATIENT
Start: 2020-01-01 | End: 2020-01-01

## 2020-01-01 RX ORDER — AMIODARONE HYDROCHLORIDE 400 MG/1
0.5 TABLET ORAL
Qty: 900 | Refills: 0 | Status: DISCONTINUED | OUTPATIENT
Start: 2020-01-01 | End: 2020-01-01

## 2020-01-01 RX ADMIN — AMIODARONE HYDROCHLORIDE 33.3 MG/MIN: 400 TABLET ORAL at 18:43

## 2020-01-01 RX ADMIN — Medication 6: at 22:13

## 2020-01-01 RX ADMIN — Medication 650 MILLIGRAM(S): at 10:19

## 2020-01-01 RX ADMIN — Medication 100 MILLIGRAM(S): at 06:07

## 2020-01-01 RX ADMIN — LISINOPRIL 10 MILLIGRAM(S): 2.5 TABLET ORAL at 17:41

## 2020-01-01 RX ADMIN — FENTANYL CITRATE 25 MICROGRAM(S): 50 INJECTION INTRAVENOUS at 20:22

## 2020-01-01 RX ADMIN — OXYCODONE HYDROCHLORIDE 5 MILLIGRAM(S): 5 TABLET ORAL at 21:26

## 2020-01-01 RX ADMIN — TRAMADOL HYDROCHLORIDE 50 MILLIGRAM(S): 50 TABLET ORAL at 12:26

## 2020-01-01 RX ADMIN — EPINEPHRINE 347 MICROGRAM(S)/KG/MIN: 0.3 INJECTION INTRAMUSCULAR; SUBCUTANEOUS at 00:57

## 2020-01-01 RX ADMIN — FENTANYL CITRATE 25 MICROGRAM(S): 50 INJECTION INTRAVENOUS at 18:50

## 2020-01-01 RX ADMIN — EPINEPHRINE 347 MICROGRAM(S)/KG/MIN: 0.3 INJECTION INTRAMUSCULAR; SUBCUTANEOUS at 23:29

## 2020-01-01 RX ADMIN — SODIUM CHLORIDE 100 MILLILITER(S): 9 INJECTION, SOLUTION INTRAVENOUS at 04:52

## 2020-01-01 RX ADMIN — INSULIN GLARGINE 10 UNIT(S): 100 INJECTION, SOLUTION SUBCUTANEOUS at 22:12

## 2020-01-01 RX ADMIN — CANGRELOR 108 MICROGRAM(S)/KG/MIN: 50 INJECTION, POWDER, LYOPHILIZED, FOR SOLUTION INTRAVENOUS at 18:52

## 2020-01-01 RX ADMIN — Medication 2 SPRAY(S): at 06:18

## 2020-01-01 RX ADMIN — TRAMADOL HYDROCHLORIDE 50 MILLIGRAM(S): 50 TABLET ORAL at 06:07

## 2020-01-01 RX ADMIN — TRAMADOL HYDROCHLORIDE 50 MILLIGRAM(S): 50 TABLET ORAL at 01:55

## 2020-01-01 RX ADMIN — OXYCODONE HYDROCHLORIDE 5 MILLIGRAM(S): 5 TABLET ORAL at 04:00

## 2020-01-01 RX ADMIN — Medication 50 MILLIEQUIVALENT(S): at 00:59

## 2020-01-01 RX ADMIN — Medication 4: at 21:30

## 2020-01-01 RX ADMIN — Medication 250 MEQ/KG/HR: at 23:11

## 2020-01-01 RX ADMIN — Medication 2 MILLIGRAM(S): at 07:09

## 2020-01-01 RX ADMIN — Medication 4: at 06:43

## 2020-01-01 RX ADMIN — Medication 650 MILLIGRAM(S): at 16:12

## 2020-01-01 RX ADMIN — TRAMADOL HYDROCHLORIDE 50 MILLIGRAM(S): 50 TABLET ORAL at 22:12

## 2020-01-01 RX ADMIN — TRAMADOL HYDROCHLORIDE 50 MILLIGRAM(S): 50 TABLET ORAL at 23:51

## 2020-01-01 RX ADMIN — Medication 8: at 17:40

## 2020-01-01 RX ADMIN — TRAMADOL HYDROCHLORIDE 50 MILLIGRAM(S): 50 TABLET ORAL at 23:12

## 2020-01-01 RX ADMIN — FENTANYL CITRATE 50 MICROGRAM(S): 50 INJECTION INTRAVENOUS at 13:43

## 2020-01-01 RX ADMIN — TRAMADOL HYDROCHLORIDE 50 MILLIGRAM(S): 50 TABLET ORAL at 15:30

## 2020-01-01 RX ADMIN — Medication 2 SPRAY(S): at 05:55

## 2020-01-01 RX ADMIN — Medication 50 MILLIEQUIVALENT(S): at 00:58

## 2020-01-01 RX ADMIN — Medication 2 SPRAY(S): at 18:30

## 2020-01-01 RX ADMIN — OXYCODONE HYDROCHLORIDE 5 MILLIGRAM(S): 5 TABLET ORAL at 06:24

## 2020-01-01 RX ADMIN — Medication 650 MILLIGRAM(S): at 08:52

## 2020-01-01 RX ADMIN — Medication 150 MILLIEQUIVALENT(S): at 20:26

## 2020-01-01 RX ADMIN — Medication 100 MILLIGRAM(S): at 20:42

## 2020-01-01 RX ADMIN — EPINEPHRINE 347 MICROGRAM(S)/KG/MIN: 0.3 INJECTION INTRAMUSCULAR; SUBCUTANEOUS at 19:48

## 2020-01-01 RX ADMIN — CEFEPIME 100 MILLIGRAM(S): 1 INJECTION, POWDER, FOR SOLUTION INTRAMUSCULAR; INTRAVENOUS at 06:07

## 2020-01-01 RX ADMIN — Medication 4.34 MICROGRAM(S)/KG/MIN: at 18:39

## 2020-01-01 RX ADMIN — Medication 1 MILLIGRAM(S): at 23:51

## 2020-01-01 RX ADMIN — OXYCODONE HYDROCHLORIDE 5 MILLIGRAM(S): 5 TABLET ORAL at 12:36

## 2020-01-01 RX ADMIN — LISINOPRIL 10 MILLIGRAM(S): 2.5 TABLET ORAL at 06:21

## 2020-01-01 RX ADMIN — Medication 650 MILLIGRAM(S): at 17:06

## 2020-01-01 RX ADMIN — FENTANYL CITRATE 4.63 MICROGRAM(S)/KG/HR: 50 INJECTION INTRAVENOUS at 13:17

## 2020-01-01 RX ADMIN — TRAMADOL HYDROCHLORIDE 50 MILLIGRAM(S): 50 TABLET ORAL at 14:31

## 2020-01-01 RX ADMIN — OXYCODONE HYDROCHLORIDE 5 MILLIGRAM(S): 5 TABLET ORAL at 02:29

## 2020-01-01 RX ADMIN — Medication 2 SPRAY(S): at 17:41

## 2020-01-01 RX ADMIN — INSULIN HUMAN 3 UNIT(S)/HR: 100 INJECTION, SOLUTION SUBCUTANEOUS at 12:41

## 2020-01-01 RX ADMIN — MEROPENEM 100 MILLIGRAM(S): 1 INJECTION INTRAVENOUS at 22:21

## 2020-01-01 RX ADMIN — OXYCODONE HYDROCHLORIDE 5 MILLIGRAM(S): 5 TABLET ORAL at 03:02

## 2020-01-01 RX ADMIN — LISINOPRIL 10 MILLIGRAM(S): 2.5 TABLET ORAL at 06:24

## 2020-01-01 RX ADMIN — Medication 650 MILLIGRAM(S): at 20:30

## 2020-01-01 RX ADMIN — OXYCODONE HYDROCHLORIDE 5 MILLIGRAM(S): 5 TABLET ORAL at 20:40

## 2020-01-01 RX ADMIN — Medication 325 MILLIGRAM(S): at 12:18

## 2020-01-01 RX ADMIN — Medication 650 MILLIGRAM(S): at 21:26

## 2020-01-01 RX ADMIN — HEPARIN SODIUM 10 UNIT(S)/HR: 5000 INJECTION INTRAVENOUS; SUBCUTANEOUS at 01:14

## 2020-01-01 RX ADMIN — PANTOPRAZOLE SODIUM 80 MILLIGRAM(S): 20 TABLET, DELAYED RELEASE ORAL at 22:24

## 2020-01-01 RX ADMIN — SODIUM CHLORIDE 1000 MILLILITER(S): 9 INJECTION, SOLUTION INTRAVENOUS at 22:24

## 2020-01-01 RX ADMIN — Medication 300 MILLIGRAM(S): at 06:07

## 2020-01-01 RX ADMIN — Medication 2 SPRAY(S): at 18:55

## 2020-01-01 RX ADMIN — TRAMADOL HYDROCHLORIDE 50 MILLIGRAM(S): 50 TABLET ORAL at 02:40

## 2020-01-01 RX ADMIN — SODIUM CHLORIDE 100 MILLILITER(S): 9 INJECTION, SOLUTION INTRAVENOUS at 00:00

## 2020-01-01 RX ADMIN — Medication 1 MILLIGRAM(S): at 23:07

## 2020-01-01 RX ADMIN — Medication 4: at 18:09

## 2020-01-01 RX ADMIN — Medication 300 MILLIGRAM(S): at 20:26

## 2020-01-01 RX ADMIN — OXYCODONE HYDROCHLORIDE 5 MILLIGRAM(S): 5 TABLET ORAL at 22:20

## 2020-01-01 RX ADMIN — VASOPRESSIN 2.4 UNIT(S)/MIN: 20 INJECTION INTRAVENOUS at 17:49

## 2020-01-01 RX ADMIN — Medication 4: at 06:21

## 2020-01-01 RX ADMIN — Medication 50 MILLIEQUIVALENT(S): at 18:53

## 2020-01-01 RX ADMIN — TRAMADOL HYDROCHLORIDE 50 MILLIGRAM(S): 50 TABLET ORAL at 06:21

## 2020-01-01 RX ADMIN — OXYCODONE HYDROCHLORIDE 5 MILLIGRAM(S): 5 TABLET ORAL at 03:30

## 2020-01-01 RX ADMIN — PROPOFOL 5.55 MICROGRAM(S)/KG/MIN: 10 INJECTION, EMULSION INTRAVENOUS at 12:43

## 2020-01-01 RX ADMIN — Medication 4: at 16:29

## 2020-01-01 RX ADMIN — LISINOPRIL 10 MILLIGRAM(S): 2.5 TABLET ORAL at 18:09

## 2020-01-01 RX ADMIN — Medication 4: at 11:14

## 2020-01-01 RX ADMIN — TRAMADOL HYDROCHLORIDE 50 MILLIGRAM(S): 50 TABLET ORAL at 17:41

## 2020-01-01 RX ADMIN — Medication 6: at 06:54

## 2020-01-01 RX ADMIN — Medication 8: at 13:31

## 2020-01-01 RX ADMIN — TRAMADOL HYDROCHLORIDE 50 MILLIGRAM(S): 50 TABLET ORAL at 11:17

## 2020-01-01 RX ADMIN — TICAGRELOR 180 MILLIGRAM(S): 90 TABLET ORAL at 18:55

## 2020-01-01 RX ADMIN — Medication 2 SPRAY(S): at 06:27

## 2020-01-01 RX ADMIN — OXYCODONE HYDROCHLORIDE 5 MILLIGRAM(S): 5 TABLET ORAL at 07:00

## 2020-01-01 RX ADMIN — TRAMADOL HYDROCHLORIDE 50 MILLIGRAM(S): 50 TABLET ORAL at 06:18

## 2020-01-01 RX ADMIN — Medication 10: at 22:16

## 2020-01-01 RX ADMIN — OXYCODONE HYDROCHLORIDE 5 MILLIGRAM(S): 5 TABLET ORAL at 19:40

## 2020-01-01 RX ADMIN — CEFEPIME 100 MILLIGRAM(S): 1 INJECTION, POWDER, FOR SOLUTION INTRAMUSCULAR; INTRAVENOUS at 22:13

## 2020-01-01 RX ADMIN — Medication 50 MILLIEQUIVALENT(S): at 18:54

## 2020-01-01 RX ADMIN — Medication 650 MILLIGRAM(S): at 09:17

## 2020-01-01 RX ADMIN — OXYCODONE HYDROCHLORIDE 5 MILLIGRAM(S): 5 TABLET ORAL at 13:30

## 2020-01-01 RX ADMIN — LISINOPRIL 10 MILLIGRAM(S): 2.5 TABLET ORAL at 18:26

## 2020-01-01 RX ADMIN — Medication 50 MILLIEQUIVALENT(S): at 22:25

## 2020-01-01 RX ADMIN — Medication 300 MILLIGRAM(S): at 18:25

## 2020-01-01 RX ADMIN — Medication 100 GRAM(S): at 18:10

## 2020-01-01 RX ADMIN — OXYCODONE HYDROCHLORIDE 5 MILLIGRAM(S): 5 TABLET ORAL at 22:08

## 2020-01-01 RX ADMIN — Medication 650 MILLIGRAM(S): at 09:15

## 2020-01-01 RX ADMIN — TRAMADOL HYDROCHLORIDE 50 MILLIGRAM(S): 50 TABLET ORAL at 07:21

## 2020-01-01 RX ADMIN — Medication 650 MILLIGRAM(S): at 10:16

## 2020-08-16 NOTE — H&P ADULT - NSHPLABSRESULTS_GEN_ALL_CORE
LABS:                        8.2    41.85 )-----------( 425      ( 19 Aug 2020 17:25 )             25.9     08-19    126<L>  |  90<L>  |  23  ----------------------------<  336<H>  5.4<H>   |  19<L>  |  1.46<H>    Ca    8.3<L>      19 Aug 2020 10:36      PT/INR - ( 19 Aug 2020 11:39 )   PT: 16.8 sec;   INR: 1.42          PTT - ( 19 Aug 2020 11:39 )  PTT:32.7 sec    CARDIAC MARKERS ( 19 Aug 2020 10:36 )  x     / 0.75 ng/mL / 317 U/L / x     / 26.6 ng/mL      Serum Pro-Brain Natriuretic Peptide: 5975 pg/mL (08-19 @ 10:36)    Lactate, Blood: 16.0 mmol/L (08-19 @ 17:25)  Lactate, Blood: 2.6 mmol/L (08-19 @ 10:35)      RADIOLOGY, EKG & ADDITIONAL TESTS: Reviewed.

## 2020-08-16 NOTE — H&P ADULT - HISTORY OF PRESENT ILLNESS
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 Patient states that he was having 10/10 headaches for the past year for which he received an MRI 1 month ago, revealing an intracranial mass. He was seen by Dr. Costello, who performed a transnasal biopsy showing a clival chordoma. He experienced intermittent oral bleeding since his biopsy, which increased in volume in the past 3 days, so he presented to the hospital. He endorses headaches, 1 episode of epistaxis, nasal congestion, progressive decreased hearing with tinnitus. He denies blurry vision, anosmia, dysphagia, odynophagia, or throat pain.     PMH: as above  PSH: cholecystectomy  Allergies: NKDA    ROS: negative for chest pain, cough, SOB, fevers, chills, abdominal pain, diarrhea    ICU Vital Signs Last 24 Hrs  T(C): --  T(F): --  HR: 103 (16 Aug 2020 12:21) (103 - 103)  BP: 150/70 (16 Aug 2020 12:21) (150/70 - 150/70)  BP(mean): --  ABP: --  ABP(mean): --  RR: 17 (16 Aug 2020 12:21) (17 - 17)  SpO2: 99% (16 Aug 2020 12:21) (99% - 99%)    Physical exam  Gen: awake in no acute distress, AAO x 3  Head: normocephalic, atraumatic  Face: CNs II-XII grossly intact  Eyes: EOMI, PERRL, visual acuity WNL  Nose: grossly normal externally, crusting anteriorly  Ears: cerumen impaction bilaterally  Oral cavity/oropharynx: poor dentition, moist mucous membranes, tongue midline, floor of mouth soft  Neck: soft, flat, full ROM    70M with HTN, HLD, DM 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    -Admitted to ENT under Dr. Felix  -Pain control  -home medications  -zofran PRN  -labetalol PRN  -saline nasal spray BID  -f/u labs: CBC, BMP, Mg, Phos, coags  -Regular diet  -ambulate as tolerated  -SCDs 70M with PMH HTN, HLD, DM transferred from outside hospital for management of bleeding clival chordoma. Patient states that he was having 10/10 headaches for the past year for which he received an MRI 1 month ago, revealing an intracranial mass. He was seen by Dr. oCstello, who performed a transnasal biopsy showing a clival chordoma. He experienced intermittent oral bleeding since his biopsy, which increased in volume in the past 3 days, so he presented to the hospital. He endorses headaches, 1 episode of epistaxis, nasal congestion, progressive decreased hearing with tinnitus. He denies blurry vision, anosmia, dysphagia, odynophagia, or throat pain.     PMH: as above  PSH: cholecystectomy  Allergies: NKDA  SH: denies smoking, alcohol, recreational drugs    ROS: negative for chest pain, cough, SOB, fevers, chills, abdominal pain, diarrhea    ICU Vital Signs Last 24 Hrs  T(C): --  T(F): --  HR: 103 (16 Aug 2020 12:21) (103 - 103)  BP: 150/70 (16 Aug 2020 12:21) (150/70 - 150/70)  BP(mean): --  ABP: --  ABP(mean): --  RR: 17 (16 Aug 2020 12:21) (17 - 17)  SpO2: 99% (16 Aug 2020 12:21) (99% - 99%)    Physical exam  Gen: awake in no acute distress, AAO x 3  Head: normocephalic, atraumatic  Face: CNs II-XII grossly intact  Eyes: EOMI, PERRL, visual acuity WNL  Nose: grossly normal externally, crusting anteriorly  Ears: cerumen impaction bilaterally  Oral cavity/oropharynx: poor dentition, moist mucous membranes, tongue midline, floor of mouth soft  Neck: soft, flat, full ROM    70M with HTN, HLD, DM 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    -Admitted to ENT under Dr. Felix  -Pain control  -home medications  -zofran PRN  -labetalol PRN  -saline nasal spray BID  -f/u labs: CBC, BMP, Mg, Phos, coags  -Regular diet  -ambulate as tolerated  -SCDs 70M with PMH HTN, HLD, DM transferred from outside hospital for management of bleeding clival chordoma. Patient states that he was having 10/10 headaches for the past year for which he received an MRI 1 month ago, revealing an intracranial mass. He was seen by Dr. Costello, who performed a transnasal biopsy showing a clival chordoma. He experienced intermittent oral bleeding since his biopsy, which increased in volume in the past 3 days, so he presented to the hospital. He endorses headaches, 1 episode of epistaxis, nasal congestion, progressive decreased hearing with tinnitus. He denies blurry vision, anosmia, dysphagia, odynophagia, or throat pain.     8/17: No acute events overnight. Patient complains of increasing headaches, will get pain consult today. Planned for MRI brain and face today and CT head/neck for navigation. Plan for OR tomorrow or thursday. Will keep NPO with IVF tonight at midnight.  8/18: Low grade fever this AM, given tylenol. Will go to OR for endoscopic endonasal CT guided biopsy of central skull base and control of epistaxis. Sugars in the high 200s, will add lantus tonight.     08/19: Patient had endonasal biopsy of clival mass in OR yesterday, found to have necrotic purulence, so ID consulted and patient started on vanc/cefepime/flagyl. Overnight patient's systolic blood pressure gradually decreased from 130s to 100s. On AM rounds, patient found to be tachycardic to 120s with oxygen saturation in the 80s. Bilateral merocel packing removed and patient placed on face tent, which improved saturation to 95%. Patient's tachycardia persisted and patient given 2L bolus without response. Pt WBC 24, , CKMB 26.6, troponin 0.75, and BNP 5975, with evidence of right heart strain on ECG. Patient transferred to ICU and started on levophed drip. Patient maintains normal mental status. Complains of chest discomfort.     PMH: as above  PSH: cholecystectomy  Allergies: NKDA  SH: denies smoking, alcohol, recreational drugs    ROS: negative for chest pain, cough, SOB, fevers, chills, abdominal pain, diarrhea TRANSFER NOTE TO CCU    70M with PMH HTN, HLD, DM transferred from outside hospital for management of bleeding clival chordoma. Patient states that he was having 10/10 headaches for the past year for which he received an MRI 1 month ago, revealing an intracranial mass. He was seen by Dr. Costello, who performed a transnasal biopsy showing a clival chordoma. He experienced intermittent oral bleeding since his biopsy, which increased in volume in the past 3 days, so he presented to the hospital. He endorses headaches, 1 episode of epistaxis, nasal congestion, progressive decreased hearing with tinnitus. He denies blurry vision, anosmia, dysphagia, odynophagia, or throat pain.     8/17: No acute events overnight. Patient complains of increasing headaches, pain consulted. Planned for MRI brain and face 8/17 and CT head/neck for navigation. Plan for OR 8/18. Will keep NPO with IVF tonight at midnight.  8/18: Low grade fever in the AM, given tylenol. Went to OR for endoscopic endonasal CT guided biopsy of central skull base tumor and control of epistaxis. Sugars in the high 200s, will add lantus tonight.     08/19: Patient had endonasal biopsy of clival mass in OR on 8/18, found to have necrotic purulence, so ID consulted and patient started on vanc/cefepime/flagyl. Overnight patient's systolic blood pressure gradually decreased from 130s to 100s.    On AM rounds, patient found to be hypotensive, tachycardic to 120s with oxygen saturation in the 80s. Bilateral merocel packing removed and patient placed on face tent, which improved saturation to 95%. Patient's tachycardia persisted and patient given 2L bolus without response. Pt WBC 24, , CKMB 26.6, troponin 0.75, and BNP 5975, with evidence of right heart strain on ECG. Patient transferred to ICU and started on levophed drip. Began to be hypoxic requiring 100% NRBM. On POCUS and CXR he was in pulmonary edema.    Now presents to CCU after cath procedure- on epinephrine, fentanyl, levophed, vasopressin, propofol    PMH: as above  PSH: cholecystectomy  Allergies: NKDA  SH: denies smoking, alcohol, recreational drugs    ROS: negative for chest pain, cough, SOB, fevers, chills, abdominal pain, diarrhea TRANSFER NOTE TO CCU    70M with PMH HTN, HLD, DM transferred from outside hospital for management of bleeding clival chordoma. Patient states that he was having 10/10 headaches for the past year for which he received an MRI 1 month ago, revealing an intracranial mass. He was seen by Dr. Costello, who performed a transnasal biopsy showing a clival chordoma. He experienced intermittent oral bleeding since his biopsy, which increased in volume in the past 3 days, so he presented to the hospital. He endorses headaches, 1 episode of epistaxis, nasal congestion, progressive decreased hearing with tinnitus. He denies blurry vision, anosmia, dysphagia, odynophagia, or throat pain.     8/17: No acute events overnight. Patient complains of increasing headaches, pain consulted. Planned for MRI brain and face 8/17 and CT head/neck for navigation. Plan for OR 8/18. Will keep NPO with IVF tonight at midnight.  8/18: Low grade fever in the AM, given tylenol. Went to OR for endoscopic endonasal CT guided biopsy of central skull base tumor and control of epistaxis. Sugars in the high 200s, will add lantus tonight.     08/19: Patient had endonasal biopsy of clival mass in OR on 8/18, found to have necrotic purulence, so ID consulted and patient started on vanc/cefepime/flagyl. Overnight patient's systolic blood pressure gradually decreased from 130s to 100s.    On AM rounds, patient found to be hypotensive, tachycardic to 120s with oxygen saturation in the 80s. Bilateral merocel packing removed and patient placed on face tent, which improved saturation to 95%. Patient's tachycardia persisted and patient given 2L bolus without response. Pt WBC 24, , CKMB 26.6, troponin 0.75, and BNP 5975, with evidence of right heart strain on ECG. Patient transferred to ICU and started on levophed drip. Began to be hypoxic requiring 100% NRBM. On POCUS and CXR he was in pulmonary edema.    Patient clinically appears to be in shock in setting of hypotension and tachycardia unresponsive to fluid boluses. Given current clinical setting concerned for septic shock, however patient reportedly with CP on night of 8/18 and EKG now w ST depressions in precordial leads and elevated troponin. Concern for septic shock vs. cardiogenic shock. On IV vancomycin and merepenem.  F/u surgical swab cultures from nasal endoscopy- Proteus growing.     Now presents to CCU after cath procedure- on epinephrine, fentanyl, levophed, vasopressin, amiodarone, propofol    PMH: as above  PSH: cholecystectomy  Allergies: NKDA  SH: denies smoking, alcohol, recreational drugs    ROS: negative for chest pain, cough, SOB, fevers, chills, abdominal pain, diarrhea TRANSFER NOTE TO CCU    70M with PMH HTN, HLD, DM transferred from outside hospital for management of bleeding clival chordoma. Patient states that he was having 10/10 headaches for the past year for which he received an MRI 1 month ago, revealing an intracranial mass. He was seen by Dr. Costello, who performed a transnasal biopsy showing a clival chordoma. He experienced intermittent oral bleeding since his biopsy, which increased in volume in the past 3 days, so he presented to the hospital. He endorses headaches, 1 episode of epistaxis, nasal congestion, progressive decreased hearing with tinnitus. He denies blurry vision, anosmia, dysphagia, odynophagia, or throat pain.     8/17: Patient complains of increasing headaches, pain consulted. Planned for MRI brain and face 8/17 and CT head/neck for navigation. Plan for OR 8/18.   8/18: Low grade fever in the AM, given tylenol. Went to OR for endoscopic endonasal CT guided biopsy of central skull base tumor and control of epistaxis. Sugars in the high 200s.    8/19: Patient had endonasal biopsy of clival mass in OR on 8/18, found to have necrotic purulence, so ID consulted and patient started on vanc/cefepime/flagyl. Overnight patient's systolic blood pressure gradually decreased from 130s to 100s.  On AM rounds, patient found to be hypotensive, tachycardic to 120s with oxygen saturation in the 80s. Received about 3L of isotonic crystalloid without resolution. Bilateral merocel packing removed and patient placed on face tent, which improved saturation to 95%. Pt WBC 24, , CKMB 26.6, troponin 0.75, and BNP 5975, with evidence of right heart strain on ECG. He was then placed on pressors and was placed on 100% NRBM due to hypoxic episode. His immediate lab values revealed WBC of 24, , CKMB 26.6, troponin 0.75 and BNP 5975. ID consult- Patient clinically appears to be in shock in setting of hypotension and tachycardia unresponsive to fluid boluses. Given current clinical setting concerned for septic shock, however patient reportedly with CP on night of 8/18 and EKG now w ST depressions in precordial leads and elevated troponin. Concern for septic shock vs. cardiogenic shock. On IV vancomycin and merepenem.  Surgical swab cultures from nasal endoscopy- Proteus growing.  With evidence of right heart strain on EKG, patient was transferred to the ICU for further care.  Patient continued deteriorating, he was brought to the cath lab. [place] hernando catheter and impella placed, showed 3 vessel coronary disease, stent placed. Pt. coded, shocked at least 4 times. Now presents to CCU after cath procedure- sedated, intubated on ventilator on epinephrine, fentanyl, levophed, vasopressin, amiodarone, propofol.    TTE 8/19- moderate concentric left ventricular hypertrophy. The entire anterior, anterolateral, apical septal, apical inferior and true apex are akinetic. Left ventricular ejection fraction is 15-20%. Right ventricle is normal in size. Right ventricular systolic function is reduced. Mitral valve is thickened with a portion of the anterior leaflet measuring 1.4 cm x 0.6 cm. Portions of the leaflet appear to have erratic motion. This is most suspicious for endocarditis in the right clinical scenario. There is no evidence of torn chordae or papillary muscle rupture, but chordae are prominent and redundant.    PMH: as above  PSH: cholecystectomy  Allergies: NKDA  SH: denies smoking, alcohol, recreational drugs    ROS: negative for chest pain, cough, SOB, fevers, chills, abdominal pain, diarrhea

## 2020-08-16 NOTE — H&P ADULT - ASSESSMENT
Neuro  #Encephalopathy 2/2 to septic vs cardiogenic shock  -Pt. sedated, intubated, on ventilator  -vent settings FiO2 100%, PEEP 8, , RR 14    #Clival chordoma  -Pt. received an MRI 1 month ago, revealing an intracranial mass. He was seen by Dr. Costello, who performed a transnasal biopsy showing a clival chordoma.   -Went to OR on 8/18 for endoscopic endonasal CT guided biopsy of central skull base tumor, clival chordoma and control of epistaxis, found to have necrotic purulence.    Cardio  #Cardiogenic shock  -EKG 8/19, ST depressions in precordial leads.  -TTE 8/19- moderate concentric left ventricular hypertrophy. The entire anterior, anterolateral, apical septal, apical inferior and true apex are akinetic. Left ventricular ejection fraction is 15-20%. Right ventricle is normal in size. Right ventricular systolic function is reduced. Mitral valve is thickened with a portion of the anterior leaflet measuring 1.4 cm x 0.6 cm. Portions of the leaflet appear to have erratic motion. This is most suspicious for endocarditis in the right clinical scenario. There is no evidence of torn chordae or papillary muscle rupture, but chordae are prominent and redundant.  -Trops 0.75, CK 1305, CKMB 88.6  -s/p cath lab 8/19, [place] hernando catheter and impella placed, showed 3 vessel coronary disease, stent placed in L main. Pt. coded, shocked at least 4 times.   -dual antiplatelet- aspirin/cangrelor  -Now on infusions of epinephrine, fentanyl, levophed, vasopressin, amiodarone, propofol.    Respiratory  Acute hypoxic respiratory failure requiring intubation  -continue sedation- propofol, fentanyl  -vent settings FiO2 100%, PEEP 8, , RR 14    GI  NPO    Renal/  Chakraborty catheter in place draining urine  Strict I&Os    Endo  Diabetes mellitus  -HbA1c of 9.7  -FSGs of 200s-300s   -insulin gtt 3 units/hr      Heme  Leukocytosis  -WBC of 41k     Anemia  H/H of 8.2/25.9      ID  Septic shock 2/2 purulent necrotic debris noted on nasal endoscopy  Surgical swab cultures from 8/18 from nasopharyngeal mass on nasal endoscopy- Proteus mirabilis, staph aureus growing.   -f/u susceptibilities   -On IV merepenem 2 g q12h, vancomycin   -f/u blood cultures x 2  -MRSA swab negative Neuro  #Encephalopathy 2/2 to septic vs cardiogenic shock  -Pt. sedated, intubated, on ventilator  -vent settings FiO2 100%, PEEP 8, , RR 14    #Clival chordoma  -Pt. received an MRI 1 month ago, revealing an intracranial mass. He was seen by Dr. Costello, who performed a transnasal biopsy showing a clival chordoma.   -Went to OR on 8/18 for endoscopic endonasal CT guided biopsy of central skull base tumor, clival chordoma and control of epistaxis, found to have necrotic purulence at skull base  -Surgical swab cultures from 8/18 from nasopharyngeal mass on nasal endoscopy- Proteus mirabilis, staph aureus growing.   -f/u susceptibilities   -On IV merepenem 2 g q12h, vancomycin       Cardio  #Cardiogenic shock  -EKG 8/19, ST depressions in precordial leads.  -TTE 8/19- moderate concentric left ventricular hypertrophy. The entire anterior, anterolateral, apical septal, apical inferior and true apex are akinetic. Left ventricular ejection fraction is 15-20%. Right ventricle is normal in size. Right ventricular systolic function is reduced. Mitral valve is thickened with a portion of the anterior leaflet measuring 1.4 cm x 0.6 cm. Portions of the leaflet appear to have erratic motion. This is most suspicious for endocarditis in the right clinical scenario. There is no evidence of torn chordae or papillary muscle rupture, but chordae are prominent and redundant.  -Trops 0.75, CK 1305, CKMB 88.6  -s/p cath lab 8/19, [place] hernando catheter and impella placed, showed 3 vessel coronary disease, stent placed in L main. Pt. coded, shocked at least 4 times.   -dual antiplatelet- aspirin/cangrelor  -Now on infusions of epinephrine, fentanyl, levophed, vasopressin, amiodarone, propofol.    Respiratory  Acute hypoxic respiratory failure requiring intubation  -continue sedation- propofol, fentanyl  -vent settings FiO2 100%, PEEP 8, , RR 14    GI  NPO    Renal/  Chakraborty catheter in place draining urine  Strict I&Os    Endo  Diabetes mellitus  -HbA1c of 9.7  -FSGs of 200s-300s   -insulin gtt 3 units/hr      Heme  Leukocytosis  -WBC of 41k     Anemia  H/H of 8.2/25.9      ID  Septic shock 2/2 purulent necrotic debris noted on nasal endoscopy  Surgical swab cultures from 8/18 from nasopharyngeal mass on nasal endoscopy- Proteus mirabilis, staph aureus growing.   -f/u susceptibilities   -On IV merepenem 2 g q12h, vancomycin   -f/u blood cultures x 2  -MRSA swab negative

## 2020-08-16 NOTE — H&P ADULT - NSHPPHYSICALEXAM_GEN_ALL_CORE
Constitutional: elderly male sedated, intubated, on ventilator  Eyes: anicteric sclera  ENT: ETT in place  Neck: supple; no JVD or thyromegaly  Respiratory: CTA B/L; no W/R/R,  Cardiac: +S1/S2; tachycardic, regular rhythm; no M/R/G;   Gastrointestinal: abdomen soft, NT/ND; no rebound or guarding; +BSx4  : Chakraborty catheter in place   Extremities: cool hands and feet, no clubbing or cyanosis; no peripheral edema  Dermatologic: skin warm, dry and intact; no rashes, wounds, or scars  Lymphatic: no submandibular or cervical LAD  Neurologic: unable to assess given sedation

## 2020-08-17 NOTE — PROGRESS NOTE ADULT - SUBJECTIVE AND OBJECTIVE BOX
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 Patient states that he was having 10/10 headaches for the past year for which he received an MRI 1 month ago, revealing an intracranial mass. He was seen by Dr. Costello, who performed a transnasal biopsy showing a clival chordoma. He experienced intermittent oral bleeding since his biopsy, which increased in volume in the past 3 days, so he presented to the hospital. He endorses headaches, 1 episode of epistaxis, nasal congestion, progressive decreased hearing with tinnitus. He denies blurry vision, anosmia, dysphagia, odynophagia, or throat pain.     8/17: No acute events overnight. Patient complains of increasing headaches, will get pain consult today. Planned for MRI brain and face today and CT head/neck for navigation. Plan for OR tomorrow or thursday. Will keep NPO with IVF tonight at midnight.    PMH: as above  PSH: cholecystectomy  Allergies: NKDA  SH: denies smoking, alcohol, recreational drugs    ROS: negative for chest pain, cough, SOB, fevers, chills, abdominal pain, diarrhea    Vital Signs Last 24 Hrs  T(C): 36.7 (17 Aug 2020 05:00), Max: 37 (16 Aug 2020 14:00)  T(F): 98 (17 Aug 2020 05:00), Max: 98.6 (16 Aug 2020 14:00)  HR: 96 (17 Aug 2020 04:52) (96 - 112)  BP: 132/67 (17 Aug 2020 04:52) (105/56 - 150/70)  BP(mean): 93 (17 Aug 2020 04:52) (75 - 93)  RR: 16 (17 Aug 2020 04:52) (16 - 17)  SpO2: 97% (17 Aug 2020 04:52) (97% - 99%)Physical exam    Physical exam  Gen: awake in no acute distress, AAO x 3  Head: normocephalic, atraumatic  Face: CNs II-XII grossly intact  Eyes: EOMI, PERRL, visual acuity WNL  Nose: grossly normal externally, crusting anteriorly  Ears: cerumen impaction bilaterally  Oral cavity/oropharynx: poor dentition, moist mucous membranes, tongue midline, floor of mouth soft  Neck: soft, flat, full ROM    70M with HTN, HLD, DM 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    -Admitted to ENT under Dr. Felix  -Pain control  -home medications  -zofran PRN  -labetalol PRN  -saline nasal spray BID  -f/u labs: CBC, BMP, Mg, Phos, coags  -Pain consult  -Regular diet  -ambulate as tolerated  -[facility]

## 2020-08-18 NOTE — CONSULT NOTE ADULT - ASSESSMENT
70M with HTN, HLD, DM with bleeding clival chordoma s/p recent transnasal biopsy, course complicated by sepsis and purulent necrotic debris noted on nasal endoscopy.    # Sepsis  Fever to 100.4 with WBC 13, with purulent necrotic debris noted on nasal endoscopy, likely 2/2 infected transnasal biopsy site. Noted h/o DM however less concern for mucormycosis given current clinical stability.    Recommendations:  - Obtain blood cultures x2  - Obtain MRSA swab  - Start IV Vancomycin 1500mg q12hr, obtain vancomycin trough timed right before the 4th dose  - Start IV Cefepime 2 grams q8hr  - Start IV Flagyl 500mg q8hr  - F/u surgical swab cultures from nasal endoscopy    Recommendations discussed with Dr. Perez. ID team 1 to follow with you.

## 2020-08-18 NOTE — PROGRESS NOTE ADULT - SUBJECTIVE AND OBJECTIVE BOX
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 Patient states that he was having 10/10 headaches for the past year for which he received an MRI 1 month ago, revealing an intracranial mass. He was seen by Dr. Costello, who performed a transnasal biopsy showing a clival chordoma. He experienced intermittent oral bleeding since his biopsy, which increased in volume in the past 3 days, so he presented to the hospital. He endorses headaches, 1 episode of epistaxis, nasal congestion, progressive decreased hearing with tinnitus. He denies blurry vision, anosmia, dysphagia, odynophagia, or throat pain.     8/17: No acute events overnight. Patient complains of increasing headaches, will get pain consult today. Planned for MRI brain and face today and CT head/neck for navigation. Plan for OR tomorrow or thursday. Will keep NPO with IVF tonight at midnight.  8/18: Low grade fever this AM, given tylenol. Will go to OR for endoscopic endonasal CT guided biopsy of central skull base and control of epistaxis.     PMH: as above  PSH: cholecystectomy  Allergies: NKDA  SH: denies smoking, alcohol, recreational drugs    ROS: negative for chest pain, cough, SOB, fevers, chills, abdominal pain, diarrhea    Physical exam  Gen: awake in no acute distress, AAO x 3  Head: normocephalic, atraumatic  Face: CNs II-XII grossly intact  Eyes: EOMI, PERRL, visual acuity WNL  Nose: grossly normal externally, crusting anteriorly  Ears: cerumen impaction bilaterally  Oral cavity/oropharynx: poor dentition, moist mucous membranes, tongue midline, floor of mouth soft  Neck: soft, flat, full ROM    70M with HTN, HLD, DM 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    -Admitted to ENT under Dr. Felix  - OR today for endoscopic endonasal biopsy  -Pain control  -home medications  -zofran PRN  -labetalol PRN  -saline nasal spray BID  -Pain consult  -Regular diet  -ambulate as tolerated  -SCDs 70M with PMH HTN, HLD, DM transferred from outside hospital for management of bleeding clival chordoma. Patient states that he was having 10/10 headaches for the past year for which he received an MRI 1 month ago, revealing an intracranial mass. He was seen by Dr. Costello, who performed a transnasal biopsy showing a clival chordoma. He experienced intermittent oral bleeding since his biopsy, which increased in volume in the past 3 days, so he presented to the hospital. He endorses headaches, 1 episode of epistaxis, nasal congestion, progressive decreased hearing with tinnitus. He denies blurry vision, anosmia, dysphagia, odynophagia, or throat pain.     8/17: No acute events overnight. Patient complains of increasing headaches, will get pain consult today. Planned for MRI brain and face today and CT head/neck for navigation. Plan for OR tomorrow or thursday. Will keep NPO with IVF tonight at midnight.  8/18: Low grade fever this AM, given tylenol. Will go to OR for endoscopic endonasal CT guided biopsy of central skull base and control of epistaxis. Sugars in the high 200s, will add lantus tonight.     PMH: as above  PSH: cholecystectomy  Allergies: NKDA  SH: denies smoking, alcohol, recreational drugs    ROS: negative for chest pain, cough, SOB, fevers, chills, abdominal pain, diarrhea    Physical exam  Gen: awake in no acute distress, AAO x 3  Head: normocephalic, atraumatic  Face: CNs II-XII grossly intact  Eyes: EOMI, PERRL, visual acuity WNL  Nose: grossly normal externally, crusting anteriorly  Ears: cerumen impaction bilaterally  Oral cavity/oropharynx: poor dentition, moist mucous membranes, tongue midline, floor of mouth soft  Neck: soft, flat, full ROM    70M with HTN, HLD, DM 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    -Admitted to ENT under Dr. Felix  - OR today for endoscopic endonasal biopsy  -Pain control  -home medications  -zofran PRN  -labetalol PRN  -ISS, add lantus 10  -saline nasal spray BID  -Pain consult  -Regular diet  -ambulate as tolerated  -SCDs

## 2020-08-18 NOTE — CONSULT NOTE ADULT - SUBJECTIVE AND OBJECTIVE BOX
Consultation Requested by: Dr. Zhou    Patient is a 70y old  Male who presents with a chief complaint of Clival chordoma (18 Aug 2020 16:15)    HPI:  70M with PMH HTN, HLD, DM transferred from outside hospital for management of bleeding clival chordoma. Patient states that he was having 10/10 headaches for the past year for which he received an MRI 1 month ago, revealing an intracranial mass. He was seen by Dr. Costello, who performed a transnasal biopsy showing a clival chordoma. He experienced intermittent oral bleeding since his biopsy, which increased in volume in the past 3 days, so he presented to the hospital. He endorses headaches, 1 episode of epistaxis, nasal congestion, progressive decreased hearing with tinnitus. He denies blurry vision, anosmia, dysphagia, odynophagia, or throat pain. 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 Pt febrile to 100.4 with WBC 13 this morning, is s/p nasal endoscopy today where he was noted to have purulent and necrotic debris at the skull base. ID consulted for antibiotic recommendations.    Pt reported recent dental work 1.5 months ago.      REVIEW OF SYSTEMS  All review of systems negative, except for those marked:  General:		[] Abnormal:  	[] Night Sweats		[] Fever		[] Weight Loss  Pulmonary/Cough:	[] Abnormal:  Cardiac/Chest Pain:	[] Abnormal:  Gastrointestinal:   	[] Abnormal:  Eyes:			        [] Abnormal:  ENT:		         	[] Abnormal:  Dysuria:		                [] Abnormal:  Musculoskeletal	:	[] Abnormal:  Endocrine:		        [] Abnormal:  Lymph Nodes:		[] Abnormal:  Headache:		        [x] Abnormal: positive  Skin:			        [] Abnormal:  Allergy/Immune:       	[] Abnormal:  Psychiatric:		        [] Abnormal:  [] All other review of systems negative  [] Unable to obtain (explain):    Recent Ill Contacts:	[x] No	[] Yes:  Recent Travel History:	[x] No	[] Yes:  Recent Animal/Insect Exposure/Tick Bites:	[x] No	[] Yes:    Allergies    No Known Allergies    Intolerances      Antimicrobials:  cefepime   IVPB 2000 milliGRAM(s) IV Intermittent every 8 hours  metroNIDAZOLE  IVPB 500 milliGRAM(s) IV Intermittent every 8 hours  vancomycin  IVPB 1500 milliGRAM(s) IV Intermittent every 12 hours      Other Medications:  acetaminophen   Tablet .. 650 milliGRAM(s) Oral every 6 hours PRN  dextrose 40% Gel 15 Gram(s) Oral once PRN  insulin glargine Injectable (LANTUS) 10 Unit(s) SubCutaneous at bedtime  insulin lispro (HumaLOG) corrective regimen sliding scale   SubCutaneous Before meals and at bedtime  labetalol Injectable 10 milliGRAM(s) IV Push every 1 hour PRN  lactated ringers. 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    IMMUNIZATIONS  [] Up to Date		[] Not Up to Date:  Recent Immunizations:	[] No	[] Yes:    Daily Height in cm: 180.3 (18 Aug 2020 10:02)    Daily   Head Circumference:  Vital Signs Last 24 Hrs  T(C): 36.6 (18 Aug 2020 17:54), Max: 38 (18 Aug 2020 09:11)  T(F): 97.8 (18 Aug 2020 17:54), Max: 100.4 (18 Aug 2020 09:11)  HR: 106 (18 Aug 2020 17:54) (100 - 109)  BP: 136/68 (18 Aug 2020 17:54) (94/55 - 148/78)  BP(mean): 69 (18 Aug 2020 17:02) (66 - 106)  RR: 16 (18 Aug 2020 17:54) (14 - 19)  SpO2: 97% (18 Aug 2020 17:54) (87% - 97%)    PHYSICAL EXAM    Respiratory Support:		[] No	[] Yes:  Vasoactive medication infusion:	[] No	[] Yes:  Venous catheters:		[] No	[] Yes:  Bladder catheter:		        [] No	[] Yes:  Other catheters or tubes:	[] No	[] Yes:    Lab Results:                        9.7    13.68 )-----------( 458      ( 17 Aug 2020 06:15 )             29.6     08-17    130<L>  |  94<L>  |  10  ----------------------------<  227<H>  4.8   |  23  |  0.86    Ca    8.7      17 Aug 2020 06:15  Phos  3.4     08-17  Mg     1.4     08-17 Consultation Requested by: Dr. Zhou    Patient is a 70y old  Male who presents with a chief complaint of Clival chordoma (18 Aug 2020 16:15)    HPI:  70M with PMH HTN, HLD, DM transferred from outside hospital for management of bleeding clival chordoma. Patient states that he was having 10/10 headaches for the past year for which he received an MRI 1 month ago, revealing an intracranial mass. He was seen by Dr. Costello, who performed a transnasal biopsy showing a clival chordoma. He experienced intermittent oral bleeding since his biopsy, which increased in volume in the past 3 days, so he presented to the hospital. He endorses headaches, 1 episode of epistaxis, nasal congestion, progressive decreased hearing with tinnitus. He denies blurry vision, anosmia, dysphagia, odynophagia, or throat pain. 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 Pt febrile to 100.4 with WBC 13 this morning, is s/p nasal endoscopy today where he was noted to have purulent and necrotic debris at the skull base. ID consulted for antibiotic recommendations.    Pt reported recent dental work 1.5 months ago.      REVIEW OF SYSTEMS  All review of systems negative, except for those marked:  General:		[] Abnormal:  	[] Night Sweats		[] Fever		[] Weight Loss  Pulmonary/Cough:	[] Abnormal:  Cardiac/Chest Pain:	[] Abnormal:  Gastrointestinal:   	[] Abnormal:  Eyes:			        [] Abnormal:  ENT:		         	[] Abnormal:  Dysuria:		                [] Abnormal:  Musculoskeletal	:	[] Abnormal:  Endocrine:		        [] Abnormal:  Lymph Nodes:		[] Abnormal:  Headache:		        [x] Abnormal: positive  Skin:			        [] Abnormal:  Allergy/Immune:       	[] Abnormal:  Psychiatric:		        [] Abnormal:  [] All other review of systems negative  [] Unable to obtain (explain):    Recent Ill Contacts:	[x] No	[] Yes:  Recent Travel History:	[x] No	[] Yes:  Recent Animal/Insect Exposure/Tick Bites:	[x] No	[] Yes:    Allergies    No Known Allergies    Intolerances      Antimicrobials:  cefepime   IVPB 2000 milliGRAM(s) IV Intermittent every 8 hours  metroNIDAZOLE  IVPB 500 milliGRAM(s) IV Intermittent every 8 hours  vancomycin  IVPB 1500 milliGRAM(s) IV Intermittent every 12 hours      Other Medications:  acetaminophen   Tablet .. 650 milliGRAM(s) Oral every 6 hours PRN  dextrose 40% Gel 15 Gram(s) Oral once PRN  insulin glargine Injectable (LANTUS) 10 Unit(s) SubCutaneous at bedtime  insulin lispro (HumaLOG) corrective regimen sliding scale   SubCutaneous Before meals and at bedtime  labetalol Injectable 10 milliGRAM(s) IV Push every 1 hour PRN  lactated ringers. 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    IMMUNIZATIONS  [] Up to Date		[] Not Up to Date:  Recent Immunizations:	[] No	[] Yes:    Daily Height in cm: 180.3 (18 Aug 2020 10:02)    Daily   Head Circumference:  Vital Signs Last 24 Hrs  T(C): 36.6 (18 Aug 2020 17:54), Max: 38 (18 Aug 2020 09:11)  T(F): 97.8 (18 Aug 2020 17:54), Max: 100.4 (18 Aug 2020 09:11)  HR: 106 (18 Aug 2020 17:54) (100 - 109)  BP: 136/68 (18 Aug 2020 17:54) (94/55 - 148/78)  BP(mean): 69 (18 Aug 2020 17:02) (66 - 106)  RR: 16 (18 Aug 2020 17:54) (14 - 19)  SpO2: 97% (18 Aug 2020 17:54) (87% - 97%)    PHYSICAL EXAM:  Constitutional: WDWN resting comfortably in bed; NAD, on nasal cannula and face tent  Head: NC/AT  Eyes: PERRL, EOMI, anicteric sclera  ENT: no nasal discharge; uvula midline, no oropharyngeal erythema or exudates; MMM, packing in b/l nares, no temporal mastoid or mandibular tenderness  Neck: supple; no JVD or thyromegaly  Respiratory: CTA B/L; no W/R/R, no retractions  Cardiac: +S1/S2; tachycardic to 110, regular rhythm; no M/R/G; PMI non-displaced  Gastrointestinal: abdomen soft, NT/ND; no rebound or guarding; +BSx4  Back: spine midline, no bony tenderness or step-offs; no CVAT B/L  Extremities: cool hands and feet, no clubbing or cyanosis; no peripheral edema  Musculoskeletal: NROM x4; no joint swelling, tenderness or erythema  Vascular: 2+ radial, femoral, DP/PT pulses B/L  Dermatologic: skin warm, dry and intact; no rashes, wounds, or scars  Lymphatic: no submandibular or cervical LAD  Neurologic: AAOx3; CNII-XII grossly intact; no focal deficits  Psychiatric: affect and characteristics of appearance, verbalizations, behaviors are appropriate    Respiratory Support:		[x] No	[] Yes:  Vasoactive medication infusion:	[x] No	[] Yes:  Venous catheters:		[x] No	[] Yes:  Bladder catheter:		        [x] No	[] Yes:  Other catheters or tubes:	[x] No	[] Yes:    Lab Results:                        9.7    13.68 )-----------( 458      ( 17 Aug 2020 06:15 )             29.6     08-17    130<L>  |  94<L>  |  10  ----------------------------<  227<H>  4.8   |  23  |  0.86    Ca    8.7      17 Aug 2020 06:15  Phos  3.4     08-17  Mg     1.4     08-17

## 2020-08-18 NOTE — CONSULT NOTE ADULT - SUBJECTIVE AND OBJECTIVE BOX
Pain Management Consult Note - [facility] [phone]      Chief Complaint: Facial pain near the temporal lobe      HPI: Patient seen and examined today. Patient with a history of epistaxis, nasopharyngeal mass, reporting persistent facial pain. Patient reports chronic facial pain near the temporal lober described as throbbing over the past 8 months. Pain has been persistent over the past week. Patient reports the pain is a constant 8 out of 10 and is relieved with Tramadol PO and oxycodone PO in the past. Patient denies any blurry vision, migraines, denies aura, sound or light sensitivity. Patient denies any secondary side effects with opioid use in the past. Patient denies illicit drug use.          Pain is x___ sharp ____dull ___burning _x__achy ___ Intensity: ____ mild _x__mod __x_severe     Location __x__surgical site ____cervical _____lumbar ____abd ____upper ext____lower ext _x___ temporal lobe    Worse with __x__activity _x___movement _____physical therapy___ Rest    Improved with _x___medication __x__rest ____physical therapy      ROS: Const:  ___febrile   Eyes:___ENT:___CV: _-__chest pain  Resp: -____sob  GI:__-_nausea __-_vomiting __-_abd pain ___npo ___clearsx __full diet __bm  :___ Musk: _x__pain ___spasm  Skin:___ Neuro:  _-__jzybjktp_-__vyayhaagk_-__ numbness -___weakness -___paresth  Psych:__anxiety  Endo:___ Heme:___Allergy:_________, _x__all others reviewed and negative      PAST MEDICAL & SURGICAL HISTORY:      SH: _-__Tobacco   -___Alcohol                          FH:FAMILY HISTORY:      acetaminophen   Tablet .. 650 milliGRAM(s) Oral every 6 hours PRN  dextrose 40% Gel 15 Gram(s) Oral once PRN  insulin glargine Injectable (LANTUS) 10 Unit(s) SubCutaneous at bedtime  insulin lispro (HumaLOG) corrective regimen sliding scale   SubCutaneous Before meals and at bedtime  labetalol Injectable 10 milliGRAM(s) IV Push every 1 hour PRN  lactated ringers. 1000 milliLiter(s) IV Continuous <Continuous>  lisinopril 10 milliGRAM(s) Oral two times a day  melatonin 1 milliGRAM(s) Oral at bedtime PRN  ondansetron    Tablet 4 milliGRAM(s) Oral every 6 hours PRN  oxyCODONE    IR 5 milliGRAM(s) Oral every 4 hours PRN  sodium chloride 0.65% Nasal 2 Spray(s) Both Nostrils two times a day  traMADol 50 milliGRAM(s) Oral every 6 hours      T(C): 36.7 (08-18-20 @ 13:22), Max: 38 (08-18-20 @ 09:11)  HR: 106 (08-18-20 @ 11:20) (100 - 109)  BP: 118/61 (08-18-20 @ 11:20) (100/58 - 148/78)  RR: 18 (08-18-20 @ 11:20) (16 - 18)  SpO2: 94% (08-18-20 @ 11:20) (94% - 97%)  Wt(kg): --    T(C): 36.7 (08-18-20 @ 13:22), Max: 38 (08-18-20 @ 09:11)  HR: 106 (08-18-20 @ 11:20) (100 - 109)  BP: 118/61 (08-18-20 @ 11:20) (100/58 - 148/78)  RR: 18 (08-18-20 @ 11:20) (16 - 18)  SpO2: 94% (08-18-20 @ 11:20) (94% - 97%)  Wt(kg): --    T(C): 36.7 (08-18-20 @ 13:22), Max: 38 (08-18-20 @ 09:11)  HR: 106 (08-18-20 @ 11:20) (100 - 109)  BP: 118/61 (08-18-20 @ 11:20) (100/58 - 148/78)  RR: 18 (08-18-20 @ 11:20) (16 - 18)  SpO2: 94% (08-18-20 @ 11:20) (94% - 97%)  Wt(kg): --      PHYSICAL EXAM:  Gen Appearance: _x__no acute distress _x__appropriate        Neuro: _x__SILT feet____ EOM Intact Psych: AAOX_3_, _x__mood/affect appropriate        Eyes: _x__conjunctiva WNL  _x____ Pupils equal and round        ENT: __x_ears and nose atraumatic_x__ Hearing grossly intact        Neck: _x__trachea midline, no visible masses ___thyroid without palpable mass    Resp: _x__Nml WOB____No tactile fremitus ___clear to auscultation    Cardio: __x_extremities free from edema _x___pedal pulses palpable    GI/Abdomen: __x_soft __x___ Nontender__x____Nondistended_____HSM    Lymphatic: ___no palpable nodes in neck  ___no palpable nodes calves and feet    Skin/Wound: ___Incision, ___Dressing c/d/i,   ____surrounding tissues soft,  ___drain/chest tube present____    Muscular: EHL _5__/5  Gastrocnemius__5_/5    ___absent clubbing/cyanosis        ASSESSMENT: This is a 70y old Male with a history of epistaxis, nasopharyngeal mass, reporting  throbbing, persistent facial pain.      Recommended Treatment PLAN:  1. Oxycodone 5mg PO Q6h prn severe pain  2.  tylenol 975mg PO Q8h   Plan discussed with Dr. Joseph

## 2020-08-19 NOTE — CONSULT NOTE ADULT - SUBJECTIVE AND OBJECTIVE BOX
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 Patient states that he was having 10/10 headaches for the past year for which he received an MRI 1 month ago, revealing an intracranial mass. He was seen by Dr. Costello, who performed a transnasal biopsy showing a clival chordoma. He experienced intermittent oral bleeding since his biopsy, which increased in volume in the past 3 days, so he presented to the hospital. He endorses headaches, 1 episode of epistaxis, nasal congestion, progressive decreased hearing with tinnitus. He denies blurry vision, anosmia, dysphagia, odynophagia, or throat pain.     8/17: No acute events overnight. Patient complains of increasing headaches, will get pain consult today. Planned for MRI brain and face today and CT head/neck for navigation. Plan for OR tomorrow or thursday. Will keep NPO with IVF tonight at midnight.  8/18: Low grade fever this AM, given tylenol. Will go to OR for endoscopic endonasal CT guided biopsy of central skull base and control of epistaxis. Sugars in the high 200s, will add lantus tonight.     08/19: Patient had endonasal biopsy of clival mass in OR yesterday, found to have necrotic purulence, so ID consulted and patient started on vanc/cefepime/flagyl. Overnight patient's systolic blood pressure gradually decreased from 130s to 100s. On AM rounds, patient found to be tachycardic to 120s with oxygen saturation in the 80s. Bilateral merocel packing removed and patient placed on face tent, which improved saturation to 95%. Patient's tachycardia persisted and patient given 2L bolus without response. Pt WBC 24, , CKMB 26.6, troponin 0.75, and BNP 5975, with evidence of right heart strain on ECG. Patient transferred to ICU and started on levophed drip. Patient maintains normal mental status. Complains of chest discomfort.     PMH: as above  PSH: cholecystectomy  Allergies: NKDA  SH: denies smoking, alcohol, recreational drugs    ROS: negative for chest pain, cough, SOB, fevers, chills, abdominal pain, diarrhea        Vital Signs Last 24 Hrs  T(C): 38.9 (19 Aug 2020 11:08), Max: 38.9 (19 Aug 2020 11:08)  T(F): 102 (19 Aug 2020 11:08), Max: 102 (19 Aug 2020 11:08)  HR: 113 (19 Aug 2020 13:00) (103 - 124)  BP: 134/72 (19 Aug 2020 13:10) (89/54 - 144/115)  BP(mean): 96 (19 Aug 2020 13:10) (65 - 123)  RR: 44 (19 Aug 2020 13:00) (14 - 44)  SpO2: 100% (19 Aug 2020 13:00) (86% - 100%)    I&O's Detail    18 Aug 2020 07:01  -  19 Aug 2020 07:00  --------------------------------------------------------  IN:    lactated ringers.: 1300 mL  Total IN: 1300 mL    OUT:    Voided: 200 mL  Total OUT: 200 mL    Total NET: 1100 mL      19 Aug 2020 07:01  -  19 Aug 2020 16:05  --------------------------------------------------------  IN:    heparin Infusion: 14 mL    insulin regular Infusion: 3 mL    norepinephrine Infusion: 74 mL  Total IN: 91 mL    OUT:    Indwelling Catheter - Urethral: 400 mL  Total OUT: 400 mL    Total NET: -309 mL        I&O's Summary    18 Aug 2020 07:01  -  19 Aug 2020 07:00  --------------------------------------------------------  IN: 1300 mL / OUT: 200 mL / NET: 1100 mL    19 Aug 2020 07:01  -  19 Aug 2020 16:05  --------------------------------------------------------  IN: 91 mL / OUT: 400 mL / NET: -309 mL        PHYSICAL EXAM:     Exam deferred due to a rapid response this afternoon, intubation and cath lab for angio.    TUBES/LINES:  [] CVC  [] A-line  [] Lumbar Drain  [] Ventriculostomy  [] Other    DIET:  [] NPO  [] Mechanical  [] Tube feeds    LABS:                        9.4    24.09 )-----------( 507      ( 19 Aug 2020 10:37 )             28.7     08-19    126<L>  |  90<L>  |  23  ----------------------------<  336<H>  5.4<H>   |  19<L>  |  1.46<H>    Ca    8.3<L>      19 Aug 2020 10:36      PT/INR - ( 19 Aug 2020 11:39 )   PT: 16.8 sec;   INR: 1.42          PTT - ( 19 Aug 2020 11:39 )  PTT:32.7 sec    CARDIAC MARKERS ( 19 Aug 2020 10:36 )  x     / 0.75 ng/mL / 317 U/L / x     / 26.6 ng/mL      CAPILLARY BLOOD GLUCOSE      POCT Blood Glucose.: 325 mg/dL (19 Aug 2020 14:38)  POCT Blood Glucose.: 361 mg/dL (19 Aug 2020 13:56)  POCT Blood Glucose.: 389 mg/dL (19 Aug 2020 13:35)  POCT Blood Glucose.: 324 mg/dL (19 Aug 2020 12:50)  POCT Blood Glucose.: 325 mg/dL (19 Aug 2020 11:17)  POCT Blood Glucose.: 262 mg/dL (19 Aug 2020 06:37)  POCT Blood Glucose.: 396 mg/dL (18 Aug 2020 22:02)  POCT Blood Glucose.: 240 mg/dL (18 Aug 2020 16:26)      Drug Levels: [] N/A    CSF Analysis: [] N/A      Allergies    No Known Allergies    Intolerances      MEDICATIONS:  Antibiotics:  meropenem  IVPB 2000 milliGRAM(s) IV Intermittent every 8 hours  vancomycin  IVPB 1500 milliGRAM(s) IV Intermittent every 12 hours    Neuro:  acetaminophen   Tablet .. 650 milliGRAM(s) Oral every 6 hours PRN  fentaNYL   Infusion. 0.5 MICROgram(s)/kG/Hr IV Continuous <Continuous>  ondansetron    Tablet 4 milliGRAM(s) Oral every 6 hours PRN  oxyCODONE    IR 5 milliGRAM(s) Oral every 4 hours PRN  propofol Infusion 10 MICROgram(s)/kG/Min IV Continuous <Continuous>  traMADol 50 milliGRAM(s) Oral every 6 hours    Anticoagulation:  heparin  Infusion 1400 Unit(s)/Hr IV Continuous <Continuous>    OTHER:  dextrose 40% Gel 15 Gram(s) Oral once PRN  insulin regular Infusion 3 Unit(s)/Hr IV Continuous <Continuous>  norepinephrine Infusion 0.05 MICROgram(s)/kG/Min IV Continuous <Continuous>  sodium chloride 0.65% Nasal 2 Spray(s) Both Nostrils two times a day    IVF:  lactated ringers Bolus 500 milliLiter(s) IV Bolus once  lactated ringers Bolus 500 milliLiter(s) IV Bolus once  lactated ringers Bolus 1000 milliLiter(s) IV Bolus once  lactated ringers. 1000 milliLiter(s) IV Continuous <Continuous>    CULTURES:  Culture Results:   No growth at 12 hours (08-18 @ 19:35)  Culture Results:   No growth at 12 hours (08-18 @ 19:35)    RADIOLOGY & ADDITIONAL TESTS:      ASSESSMENT:  70y Male s/p    EPISTAXIS  Handoff  MEWS Score  Nasopharyngeal mass  Nasopharyngeal mass  Nasopharyngoscopy      PLAN:  -      Assessment:  Present when checked    []  GCS  E   V  M     Heart Failure: []Acute, [] acute on chronic , []chronic  Heart Failure:  [] Diastolic (HFpEF), [] Systolic (HFrEF), []Combined (HFpEF and HFrEF), [] RHF, [] Pulm HTN, [] Other    [] HESHAM, [] ATN, [] AIN, [] other  [] CKD1, [] CKD2, [] CKD 3, [] CKD 4, [] CKD 5, []ESRD    Encephalopathy: [] Metabolic, [] Hepatic, [] toxic, [] Neurological, [] Other    Abnormal Nurtitional Status: [] malnurtition (see nutrition note), [ ]underweight: BMI < 19, [] morbid obesity: BMI >40, [] Cachexia    [] Sepsis  [] hypovolemic shock,[] cardiogenic shock, [] hemorrhagic shock, [] neuogenic shock  [] Acute Respiratory Failure  []Cerebral edema, [] Brain compression/ herniation,   [] Functional quadriplegia  [] Acute blood loss anemia 70M with PMH HTN, HLD, DM transferred from outside hospital for management of bleeding clival chordoma. Patient states that he was having 10/10 headaches for the past year for which he received an MRI 1 month ago, revealing an intracranial mass. He was seen by Dr. Costello, who performed a transnasal biopsy showing a clival chordoma. He experienced intermittent oral bleeding since his biopsy, which increased in volume in the past 3 days, so he presented to the hospital. He endorses headaches, 1 episode of epistaxis, nasal congestion, progressive decreased hearing with tinnitus. He denies blurry vision, anosmia, dysphagia, odynophagia, or throat pain.     8/17: No acute events overnight. Patient complains of increasing headaches, will get pain consult today. Planned for MRI brain and face today and CT head/neck for navigation. Plan for OR tomorrow or thursday. Will keep NPO with IVF tonight at midnight.  8/18: Low grade fever this AM, given tylenol. Will go to OR for endoscopic endonasal CT guided biopsy of central skull base and control of epistaxis. Sugars in the high 200s, will add lantus tonight.     08/19: Patient had endonasal biopsy of clival mass in OR yesterday, found to have necrotic purulence, so ID consulted and patient started on vanc/cefepime/flagyl. Overnight patient's systolic blood pressure gradually decreased from 130s to 100s. On AM rounds, patient found to be tachycardic to 120s with oxygen saturation in the 80s. Bilateral merocel packing removed and patient placed on face tent, which improved saturation to 95%. Patient's tachycardia persisted and patient given 2L bolus without response. Pt WBC 24, , CKMB 26.6, troponin 0.75, and BNP 5975, with evidence of right heart strain on ECG. Patient transferred to ICU and started on levophed drip. Patient maintains normal mental status. Complains of chest discomfort.     PMH: as above  PSH: cholecystectomy  Allergies: NKDA  SH: denies smoking, alcohol, recreational drugs    ROS: negative for chest pain, cough, SOB, fevers, chills, abdominal pain, diarrhea        Vital Signs Last 24 Hrs  T(C): 38.9 (19 Aug 2020 11:08), Max: 38.9 (19 Aug 2020 11:08)  T(F): 102 (19 Aug 2020 11:08), Max: 102 (19 Aug 2020 11:08)  HR: 113 (19 Aug 2020 13:00) (103 - 124)  BP: 134/72 (19 Aug 2020 13:10) (89/54 - 144/115)  BP(mean): 96 (19 Aug 2020 13:10) (65 - 123)  RR: 44 (19 Aug 2020 13:00) (14 - 44)  SpO2: 100% (19 Aug 2020 13:00) (86% - 100%)    I&O's Detail    18 Aug 2020 07:01  -  19 Aug 2020 07:00  --------------------------------------------------------  IN:    lactated ringers.: 1300 mL  Total IN: 1300 mL    OUT:    Voided: 200 mL  Total OUT: 200 mL    Total NET: 1100 mL      19 Aug 2020 07:01  -  19 Aug 2020 16:05  --------------------------------------------------------  IN:    heparin Infusion: 14 mL    insulin regular Infusion: 3 mL    norepinephrine Infusion: 74 mL  Total IN: 91 mL    OUT:    Indwelling Catheter - Urethral: 400 mL  Total OUT: 400 mL    Total NET: -309 mL        I&O's Summary    18 Aug 2020 07:01  -  19 Aug 2020 07:00  --------------------------------------------------------  IN: 1300 mL / OUT: 200 mL / NET: 1100 mL    19 Aug 2020 07:01  -  19 Aug 2020 16:05  --------------------------------------------------------  IN: 91 mL / OUT: 400 mL / NET: -309 mL        PHYSICAL EXAM:     Exam deferred due to a rapid response this afternoon, intubation and cath lab for angio.    TUBES/LINES:  [] CVC  [] A-line  [] Lumbar Drain  [] Ventriculostomy  [] Other    DIET:  [] NPO  [] Mechanical  [] Tube feeds    LABS:                        9.4    24.09 )-----------( 507      ( 19 Aug 2020 10:37 )             28.7     08-19    126<L>  |  90<L>  |  23  ----------------------------<  336<H>  5.4<H>   |  19<L>  |  1.46<H>    Ca    8.3<L>      19 Aug 2020 10:36      PT/INR - ( 19 Aug 2020 11:39 )   PT: 16.8 sec;   INR: 1.42          PTT - ( 19 Aug 2020 11:39 )  PTT:32.7 sec    CARDIAC MARKERS ( 19 Aug 2020 10:36 )  x     / 0.75 ng/mL / 317 U/L / x     / 26.6 ng/mL      CAPILLARY BLOOD GLUCOSE      POCT Blood Glucose.: 325 mg/dL (19 Aug 2020 14:38)  POCT Blood Glucose.: 361 mg/dL (19 Aug 2020 13:56)  POCT Blood Glucose.: 389 mg/dL (19 Aug 2020 13:35)  POCT Blood Glucose.: 324 mg/dL (19 Aug 2020 12:50)  POCT Blood Glucose.: 325 mg/dL (19 Aug 2020 11:17)  POCT Blood Glucose.: 262 mg/dL (19 Aug 2020 06:37)  POCT Blood Glucose.: 396 mg/dL (18 Aug 2020 22:02)  POCT Blood Glucose.: 240 mg/dL (18 Aug 2020 16:26)      Drug Levels: [] N/A    CSF Analysis: [] N/A      Allergies    No Known Allergies    Intolerances      MEDICATIONS:  Antibiotics:  meropenem  IVPB 2000 milliGRAM(s) IV Intermittent every 8 hours  vancomycin  IVPB 1500 milliGRAM(s) IV Intermittent every 12 hours    Neuro:  acetaminophen   Tablet .. 650 milliGRAM(s) Oral every 6 hours PRN  fentaNYL   Infusion. 0.5 MICROgram(s)/kG/Hr IV Continuous <Continuous>  ondansetron    Tablet 4 milliGRAM(s) Oral every 6 hours PRN  oxyCODONE    IR 5 milliGRAM(s) Oral every 4 hours PRN  propofol Infusion 10 MICROgram(s)/kG/Min IV Continuous <Continuous>  traMADol 50 milliGRAM(s) Oral every 6 hours    Anticoagulation:  heparin  Infusion 1400 Unit(s)/Hr IV Continuous <Continuous>    OTHER:  dextrose 40% Gel 15 Gram(s) Oral once PRN  insulin regular Infusion 3 Unit(s)/Hr IV Continuous <Continuous>  norepinephrine Infusion 0.05 MICROgram(s)/kG/Min IV Continuous <Continuous>  sodium chloride 0.65% Nasal 2 Spray(s) Both Nostrils two times a day    IVF:  lactated ringers Bolus 500 milliLiter(s) IV Bolus once  lactated ringers Bolus 500 milliLiter(s) IV Bolus once  lactated ringers Bolus 1000 milliLiter(s) IV Bolus once  lactated ringers. 1000 milliLiter(s) IV Continuous <Continuous>    CULTURES:  Culture Results:   No growth at 12 hours (08-18 @ 19:35)  Culture Results:   No growth at 12 hours (08-18 @ 19:35)    RADIOLOGY & ADDITIONAL TESTS:    CT Head No Cont (08.17.20 @ 10:20) >  Navigational CT exam of the brain. No acute intracranial finding.    Bulky soft tissue fullness of the nasopharynx with cortical erosion of the ventral clivus. See MRI for further detail of soft tissue. Left maxillary sinus total opacification, possibly odontogenic in etiology.      EPISTAXIS  Handoff  MEWS Score  Nasopharyngeal mass  Nasopharyngeal mass  Nasopharyngoscopy      PLAN:  -      Assessment:  Present when checked    []  GCS  E   V  M     Heart Failure: []Acute, [] acute on chronic , []chronic  Heart Failure:  [] Diastolic (HFpEF), [] Systolic (HFrEF), []Combined (HFpEF and HFrEF), [] RHF, [] Pulm HTN, [] Other    [] HESHAM, [] ATN, [] AIN, [] other  [] CKD1, [] CKD2, [] CKD 3, [] CKD 4, [] CKD 5, []ESRD    Encephalopathy: [] Metabolic, [] Hepatic, [] toxic, [] Neurological, [] Other    Abnormal Nurtitional Status: [] malnurtition (see nutrition note), [ ]underweight: BMI < 19, [] morbid obesity: BMI >40, [] Cachexia    [] Sepsis  [] hypovolemic shock,[] cardiogenic shock, [] hemorrhagic shock, [] neuogenic shock  [] Acute Respiratory Failure  []Cerebral edema, [] Brain compression/ herniation,   [] Functional quadriplegia  [] Acute blood loss anemia

## 2020-08-19 NOTE — PROGRESS NOTE ADULT - ASSESSMENT
70M with HTN, HLD, DM with bleeding clival chordoma s/p recent transnasal biopsy, course complicated by sepsis and purulent necrotic debris noted on nasal endoscopy.    #Shock  Patient clinically appears to be in shock in setting of hypotension and tachycardia unresponsive to fluid boluses. Given current clinical setting concerned for septic shock, however patient reportedly with CP on night of 8/18 and EKG now w ST depressions in precordial leads and elevated troponin. Concern for septic shock vs. cardiogenic shock.   - c/w IV Vancomycin 1500mg q12hr, obtain vancomycin trough timed right before the 4th dose   - Given worsening clinical picture please start patient on meropenem 2g q8hr.    - MRSA swab negative  - F/u surgical swab cultures from nasal endoscopy- Proteus growing.   - Blood cultures with no growth at 12 hours.     Recommendations discussed with Dr. Perez. ID team 1 to follow with you. 70M with HTN, HLD, DM with bleeding clival chordoma s/p recent transnasal biopsy, course complicated by sepsis and purulent necrotic debris noted on nasal endoscopy.    #Shock  Patient clinically appears to be in shock in setting of hypotension and tachycardia unresponsive to fluid boluses. Given current clinical setting concerned for septic shock, however patient reportedly with CP on night of 8/18 and EKG now w ST depressions in precordial leads and elevated troponin. Concern for septic shock vs. cardiogenic shock.   - c/w IV Vancomycin 1500mg q12hr, obtain vancomycin trough timed right before the 4th dose  SEE BELOW   - Given worsening clinical picture please start patient on meropenem 1g q8hr.    - D/C cefepime and metronidazole  - MRSA swab negative  - F/u surgical swab cultures from nasal endoscopy- Proteus growing.   - Blood cultures with no growth at 12 hours.     Recommendations discussed with Dr. Perez. ID team 1 to follow with you.

## 2020-08-19 NOTE — CONSULT NOTE ADULT - SUBJECTIVE AND OBJECTIVE BOX
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 Patient states that he was having 10/10 headaches for the past year for which he received an MRI 1 month ago, revealing an intracranial mass. He was seen by Dr. Costello, who performed a transnasal biopsy showing a clival chordoma. He experienced intermittent oral bleeding since his biopsy, which increased in volume in the past 3 days, so he presented to the hospital. He endorses headaches, 1 episode of epistaxis, nasal congestion, progressive decreased hearing with tinnitus. He denies blurry vision, anosmia, dysphagia, odynophagia, or throat pain.     Meds: Ramapril, Metformin, Glipizide    Surgery 8/18: nasopharygeal accessed biopsy of skull base tumor    Allergies    No Known Allergies    Intolerances        Vitals: Vital Signs Last 24 Hrs  T(C): 38.9 (19 Aug 2020 11:08), Max: 38.9 (19 Aug 2020 11:08)  T(F): 102 (19 Aug 2020 11:08), Max: 102 (19 Aug 2020 11:08)  HR: 120 (19 Aug 2020 12:15) (103 - 124)  BP: 144/115 (19 Aug 2020 11:23) (89/54 - 144/115)  BP(mean): 123 (19 Aug 2020 11:23) (65 - 123)  RR: 31 (19 Aug 2020 12:15) (14 - 34)  SpO2: 89% (19 Aug 2020 12:15) (87% - 99%)  CAPILLARY BLOOD GLUCOSE      POCT Blood Glucose.: 324 mg/dL (19 Aug 2020 12:50)      Labs:                         9.4    24.09 )-----------( 507      ( 19 Aug 2020 10:37 )             28.7   08-19    126<L>  |  90<L>  |  23  ----------------------------<  336<H>  5.4<H>   |  19<L>  |  1.46<H>    Ca    8.3<L>      19 Aug 2020 10:36    PT/INR - ( 19 Aug 2020 11:39 )   PT: 16.8 sec;   INR: 1.42          PTT - ( 19 Aug 2020 11:39 )  PTT:32.7 sec    Exam:  Gen: diaphoretic, pale  Neuro: A&Ox3, grossly neuro intact  HEENT: PERRL < EOMI, MMM  Neck: Supple  CV: regular tachycardia, no MRGs  Pulm: CTAB, fine crackles at the bases, decreased breath sounds at the bases  Abd: Obese, BS (+), Soft, NT, ND  : Chakraborty placed  Ext: No edema peripherally or centrally  Vasc: 2+ pulses - radial and PT  MSK: no joint swelling  Skin: no rash  Psych: distressed, nervous 70M with PMH HTN, HLD, DM transferred from outside hospital for management of bleeding clival chordoma. Patient states that he was having 10/10 headaches for the past year for which he received an MRI 1 month ago, revealing an intracranial mass. He was seen by Dr. Costello, who performed a transnasal biopsy showing a clival chordoma. He experienced intermittent oral bleeding since his biopsy, which increased in volume in the past 3 days, so he presented to the hospital. He endorses headaches, 1 episode of epistaxis, nasal congestion, progressive decreased hearing with tinnitus. He denies blurry vision, anosmia, dysphagia, odynophagia, or throat pain. On biopsy purulence was noted and he was started on vanco/cefepime/flagyl by ID service. This AM he was noted to be hypotensive and received about 3 liters of isotonic crystalloid but was still hypotensive requiring pressors and began to be hypoxic requiring 100 % NRBM. On POCUS and CXR he was in pulmonary edema.    Meds: Ramapril, Metformin, Glipizide    Surgery 8/18: nasopharygeal accessed biopsy of skull base tumor    Allergies    No Known Allergies    Intolerances        Vitals: Vital Signs Last 24 Hrs  T(C): 38.9 (19 Aug 2020 11:08), Max: 38.9 (19 Aug 2020 11:08)  T(F): 102 (19 Aug 2020 11:08), Max: 102 (19 Aug 2020 11:08)  HR: 120 (19 Aug 2020 12:15) (103 - 124)  BP: 144/115 (19 Aug 2020 11:23) (89/54 - 144/115)  BP(mean): 123 (19 Aug 2020 11:23) (65 - 123)  RR: 31 (19 Aug 2020 12:15) (14 - 34)  SpO2: 89% (19 Aug 2020 12:15) (87% - 99%)  CAPILLARY BLOOD GLUCOSE      POCT Blood Glucose.: 324 mg/dL (19 Aug 2020 12:50)      Labs:                         9.4    24.09 )-----------( 507      ( 19 Aug 2020 10:37 )             28.7   08-19    126<L>  |  90<L>  |  23  ----------------------------<  336<H>  5.4<H>   |  19<L>  |  1.46<H>    Ca    8.3<L>      19 Aug 2020 10:36    PT/INR - ( 19 Aug 2020 11:39 )   PT: 16.8 sec;   INR: 1.42          PTT - ( 19 Aug 2020 11:39 )  PTT:32.7 sec    Exam:  Gen: diaphoretic, pale  Neuro: A&Ox3, grossly neuro intact  HEENT: PERRL < EOMI, MMM  Neck: Supple  CV: regular tachycardia, no MRGs  Pulm: CTAB, fine crackles at the bases, decreased breath sounds at the bases  Abd: Obese, BS (+), Soft, NT, ND  : Chakraborty placed  Ext: No edema peripherally or centrally, warm  Vasc: 2+ pulses - radial and PT  MSK: no joint swelling  Skin: no rash  Psych: affect appropriate

## 2020-08-19 NOTE — PROCEDURE NOTE - NSINFORMCONSENT_GEN_A_CORE
Emergent procedure. Verbal consent provided by patient/Benefits, risks, and possible complications of procedure explained to patient/caregiver who verbalized understanding and gave verbal consent.

## 2020-08-19 NOTE — PROGRESS NOTE ADULT - ATTENDING COMMENTS
I have reviewed the medical record, including laboratory and radiographic studies, examined the patient and discussed the plan with Dr. Yee, the ID Resident.  Agree with above.  Events noted.  He is very critically ill.  Cultures are growing presumptive MRSA and Proteus.  He is in septic and cardiogenic shock with severe LM/3V disease.  Would treat with vanc and meropenem for now.  He likely will have HESHAM - would dose vanc by level.  Will continue to follow with you – ID Team 1.  Dr. Rogers will assume care tomorrow.

## 2020-08-19 NOTE — PROGRESS NOTE ADULT - SUBJECTIVE AND OBJECTIVE BOX
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 Patient had an endonasal biopsy of clival mass in OR on 8/18, was found to have necrotic purulence, so was subsequently started on meropenem and vancomycin. Overnight, patient's systolic blood pressure gradually decreased from 130s to 100s. On AM rounds, patient was found to be hypotensive, tachycardic to 120s with oxygen saturation in the 80s. Cardiology was  consulted for ST changes (found to have ST elevation in aVR and ST depression in V1-V3) and troponin of 0.75. Heparin ggt was started, patient was loaded with aspirin and later Brilinta. Central line and a-line were placed. Echo showed evidence of global hypokinesis and an EF of 15%. Patient became less responsive so was intubated in cath lab and a PCI was performed. Found to have extensive CAD and occlusion of the LAD, so a stent was placed in the proximal to mid LAD with an Impella assistive device. Patient had a cardiac arrest in cath lab but ROSC was achieved after 4 shocks. Patient arrived to CCU intubated and sedated on propofol and fentanyl, on amiodarone, epinephrine, vasopressin, and levophed. Lactate was 16. Patient received 2 amps of bicarb and 250 cc bolus of NS. Was dosed for meropenem and vancomycin.        SUBJECTIVE: Patient seen and examined at bedside. Intubated and sedated, so unable to obtain ROS.       OBJECTIVE:  VITAL SIGNS:  ICU Vital Signs Last 24 Hrs  T(C): 37.7 (19 Aug 2020 20:00), Max: 38.9 (19 Aug 2020 11:08)  T(F): 99.9 (19 Aug 2020 20:00), Max: 102 (19 Aug 2020 11:08)  HR: 88 (19 Aug 2020 22:00) (88 - 124)  BP: 134/72 (19 Aug 2020 13:10) (89/54 - 144/115)  BP(mean): 96 (19 Aug 2020 13:10) (65 - 123)  ABP: 68/57 (19 Aug 2020 22:00) (68/57 - 129/70)  ABP(mean): 60 (19 Aug 2020 22:00) (60 - 94)  RR: 23 (19 Aug 2020 21:23) (7 - 44)  SpO2: 65% (19 Aug 2020 22:00) (65% - 100%)    Mode: AC/ CMV (Assist Control/ Continuous Mandatory Ventilation), RR (machine): 22, TV (machine): 530, FiO2: 100, PEEP: 8, ITime: 0.8, MAP: 12, PIP: 25    08-18 @ 07:01  -  08-19 @ 07:00  --------------------------------------------------------  IN: 1300 mL / OUT: 200 mL / NET: 1100 mL    08-19 @ 07:01  -  08-19 @ 22:41  --------------------------------------------------------  IN: 3413 mL / OUT: 875 mL / NET: 2538 mL      CAPILLARY BLOOD GLUCOSE  POCT Blood Glucose.: 186 mg/dL (19 Aug 2020 21:35)        PHYSICAL EXAM:     Constitutional: WNWD, elderly male, intubated and sedated   	Eyes: anicteric sclera, pupils dilated b/l, minimal pupillary response   	ENT: ETT in place, MMM, +OG tube   	Neck: supple; no JVD or thyromegaly  	Respiratory: CTA B/L; no W/R/R,  	Cardiac: +S1/S2; tachycardic, regular rhythm; no M/R/G;   	Gastrointestinal: abdomen soft, distended/tympanic but NT, no rebound or guarding; +BSx4, sump in place on suction  	: Chakraborty catheter in place   	Extremities: cool hands and feet, no clubbing or cyanosis; no peripheral edema, fait LE DP pulses   	Dermatologic: skin warm, dry and intact; no rashes, wounds, or scars  	Lymphatic: no submandibular or cervical LAD    Neurologic: unable to assess given sedation, does not withdraw to pain     Lines: a-line, [place], OG tube (08/18)       MEDICATIONS:  MEDICATIONS  (STANDING):  aMIOdarone Infusion 1 mG/Min (33.3 mL/Hr) IV Continuous <Continuous>  aMIOdarone Infusion 0.5 mG/Min (16.7 mL/Hr) IV Continuous <Continuous>  chlorhexidine 4% Liquid 1 Application(s) Topical <User Schedule>  dextrose 5%. 1000 milliLiter(s) (50 mL/Hr) IV Continuous <Continuous>  dextrose 50% Injectable 25 Gram(s) IV Push once  EPINEPHrine    Infusion 2 MICROgram(s)/kG/Min (347 mL/Hr) IV Continuous <Continuous>  fentaNYL   Infusion. 0.5 MICROgram(s)/kG/Hr (4.63 mL/Hr) IV Continuous <Continuous>  heparin 25 Units/mL (IMPELLA VAD) Purge Solution  Unit(s) (12 mL/Hr) Ventricular Assist Device <Continuous>  insulin lispro (HumaLOG) corrective regimen sliding scale   SubCutaneous Before meals and at bedtime  insulin regular Infusion 3 Unit(s)/Hr (3 mL/Hr) IV Continuous <Continuous>  lactated ringers Bolus 500 milliLiter(s) IV Bolus once  lactated ringers Bolus 500 milliLiter(s) IV Bolus once  lactated ringers Bolus 1000 milliLiter(s) IV Bolus once  lactated ringers. 1000 milliLiter(s) (100 mL/Hr) IV Continuous <Continuous>  meropenem  IVPB 2000 milliGRAM(s) IV Intermittent every 12 hours  norepinephrine Infusion 0.05 MICROgram(s)/kG/Min (4.34 mL/Hr) IV Continuous <Continuous>  propofol Infusion 10 MICROgram(s)/kG/Min (5.55 mL/Hr) IV Continuous <Continuous>  sodium bicarbonate  Infusion 2 mEq/kG/Hr (1233 mL/Hr) IV Continuous <Continuous>  sodium chloride 0.65% Nasal 2 Spray(s) Both Nostrils two times a day  sodium chloride 0.9% Bolus 250 milliLiter(s) IV Bolus once  vasopressin Infusion 0.04 Unit(s)/Min (2.4 mL/Hr) IV Continuous <Continuous>    MEDICATIONS  (PRN):  acetaminophen   Tablet .. 650 milliGRAM(s) Oral every 6 hours PRN Temp greater or equal to 38C (100.4F), Mild Pain (1 - 3)  dextrose 40% Gel 15 Gram(s) Oral once PRN Blood Glucose LESS THAN 70 milliGRAM(s)/deciliter      ALLERGIES:  Allergies    No Known Allergies    Intolerances        LABS:                        7.3    38.47 )-----------( 175      ( 19 Aug 2020 21:49 )             23.4     08-19    135  |  95<L>  |  25<H>  ----------------------------<  224<H>  5.4<H>   |  9<LL>  |  1.97<H>    Ca    8.8      19 Aug 2020 18:47    TPro  4.9<L>  /  Alb  2.1<L>  /  TBili  1.0  /  DBili  x   /  AST  2019<H>  /  ALT  1792<H>  /  AlkPhos  121<H>  08-19    PT/INR - ( 19 Aug 2020 18:47 )   PT: 26.5 sec;   INR: 2.30          PTT - ( 19 Aug 2020 21:49 )  PTT:70.5 sec      RADIOLOGY & ADDITIONAL TESTS: Reviewed.  < from: Xray Chest 1 View AP/PA (08.19.20 @ 11:09) >  INTERPRETATION:  PORTABLE CHEST X-RAY    Indication: Chest Pain    Bedside examination of the chest dated 8/19/2020 11:09 AM is compared to the previous studyof 8/16/2020.    FINDINGS: Two views of chest obtained at bedside. Study is limited by patient positioning. There is new pulmonary vascular congestion. No definite pleural effusion. Cardiomediastinal silhouette suboptimally evaluated in this projection. Degenerative changes of spine. Degenerative changes of left shoulder. Soft tissues unremarkable.    IMPRESSION: There has been development of pulmonary vascular congestion.    < end of copied text >    ASSESSMENT:   70M with PMH HTN, HLD, DM, who transferred from outside hospital for management of bleeding clival chordoma. Patient had an endonasal biopsy of clival mass in OR on 8/18, was found to have necrotic purulence, so was subsequently started on meropenem and vancomycin. Course complicated by chest pain in the setting of ST depressions on EKG, trop of 0.75, so now s/p cath on 08/19 and Impella-assisted PCI with stent placed to the proximal LAD. S/p cardiac arrest in cath lab and admitted to the CCU with further management of cardiogenic shock, requiring 3 pressors.                       #Encephalopathy 2/2 to septic vs cardiogenic shock  -Pt. sedated, intubated, on ventilator  -vent settings FiO2 100%, PEEP 8, , RR 14    #Clival chordoma  -Pt. received an MRI 1 month ago, revealing an intracranial mass. He was seen by Dr. Costello, who performed a transnasal biopsy showing a clival chordoma.   -Went to OR on 8/18 for endoscopic endonasal CT guided biopsy of central skull base tumor, clival chordoma and control of epistaxis, found to have necrotic purulence at skull base  -Surgical swab cultures from 8/18 from nasopharyngeal mass on nasal endoscopy- Proteus mirabilis, staph aureus growing.   -f/u susceptibilities   -On IV merepenem 2 g q12h, vancomycin       Cardio  #Cardiogenic shock  -EKG 8/19, ST depressions in precordial leads.  -TTE 8/19- moderate concentric left ventricular hypertrophy. The entire anterior, anterolateral, apical septal, apical inferior and true apex are akinetic. Left ventricular ejection fraction is 15-20%. Right ventricle is normal in size. Right ventricular systolic function is reduced. Mitral valve is thickened with a portion of the anterior leaflet measuring 1.4 cm x 0.6 cm. Portions of the leaflet appear to have erratic motion. This is most suspicious for endocarditis in the right clinical scenario. There is no evidence of torn chordae or papillary muscle rupture, but chordae are prominent and redundant.  -Trops 0.75, CK 1305, CKMB 88.6  -s/p cath lab 8/19, [place] hernando catheter and impella placed, showed 3 vessel coronary disease, stent placed in L main. Pt. coded, shocked at least 4 times.   -dual antiplatelet- aspirin/cangrelor  -Now on infusions of epinephrine, fentanyl, levophed, vasopressin, amiodarone, propofol.    Respiratory  Acute hypoxic respiratory failure requiring intubation  -continue sedation- propofol, fentanyl  -vent settings FiO2 100%, PEEP 8, , RR 14    GI  NPO    Renal/  Chakraborty catheter in place draining urine  Strict I&Os    Endo  Diabetes mellitus  -HbA1c of 9.7  -FSGs of 200s-300s   -insulin gtt 3 units/hr      Heme  Leukocytosis  -WBC of 41k     Anemia  H/H of 8.2/25.9      ID  Septic shock 2/2 purulent necrotic debris noted on nasal endoscopy  Surgical swab cultures from 8/18 from nasopharyngeal mass on nasal endoscopy- Proteus mirabilis, staph aureus growing.   -f/u susceptibilities   -On IV merepenem 2 g q12h, vancomycin   -f/u blood cultures x 2  -MRSA swab negative          CV: hypotensive on levophed gtt MAP goal 65  Pulm: Intubated /100/14/8  GI/FEN: NPO  : Chakraborty  ID: Vanco (8/18-), [place] (8/19-) ID following // dc'd cefipime (8/18-19), flagyl (8/18-19)  Endo: insulin gtt  Heme: STEMI: heparin gtt ptt goal 60-80, asa 81, plavix 75 (dual antiplatelet loaded 8/19)  PPX: SCDs  Lines: RIJ TLC (8/19-), [place] (8/19-)   Wounds: skull base tumor biopsy in nasopharynx  PT/OT: not ordered Transfer Acceptance Note from ENT to CCU:   HOSPITAL COURSE:   70M with PMH HTN, HLD, DM, who transferred from outside hospital for management of bleeding clival chordoma. Patient had an endonasal biopsy of clival mass in OR on 8/18, was found to have necrotic purulence, so was subsequently started on meropenem/vancomycin after initially being on ceftiraxone/flagy Overnight, patient's systolic blood pressure gradually decreased from 130s to 100s. On AM rounds, patient was found to be hypotensive, tachycardic to 120s with oxygen saturation in the 80s. Cardiology was  consulted for ST changes (found to have ST elevation in aVR and ST depression in V1-V3) and troponin of 0.75. Heparin ggt was started, patient was loaded with aspirin and later Brilinta. Central line and a-line were placed. Echo showed evidence of global hypokinesis and an EF of 15%. Patient became less responsive so was intubated in cath lab and a PCI was performed. Found to have extensive CAD and occlusion of the LAD, so a stent was placed in the proximal to mid LAD with an Impella assistive device. Patient had a cardiac arrest in cath lab but ROSC was achieved after 4 shocks. Patient arrived to CCU intubated and sedated on propofol and fentanyl, on amiodarone, epinephrine, vasopressin, and levophed. Lactate was 16. Patient received 2 amps of bicarb and 250 cc bolus of NS. Was dosed for meropenem and vancomycin.        SUBJECTIVE: Patient seen and examined at bedside. Intubated and sedated, so unable to obtain ROS.       OBJECTIVE:  VITAL SIGNS:  ICU Vital Signs Last 24 Hrs  T(C): 37.7 (19 Aug 2020 20:00), Max: 38.9 (19 Aug 2020 11:08)  T(F): 99.9 (19 Aug 2020 20:00), Max: 102 (19 Aug 2020 11:08)  HR: 88 (19 Aug 2020 22:00) (88 - 124)  BP: 134/72 (19 Aug 2020 13:10) (89/54 - 144/115)  BP(mean): 96 (19 Aug 2020 13:10) (65 - 123)  ABP: 68/57 (19 Aug 2020 22:00) (68/57 - 129/70)  ABP(mean): 60 (19 Aug 2020 22:00) (60 - 94)  RR: 23 (19 Aug 2020 21:23) (7 - 44)  SpO2: 65% (19 Aug 2020 22:00) (65% - 100%)    Mode: AC/ CMV (Assist Control/ Continuous Mandatory Ventilation), RR (machine): 22, TV (machine): 530, FiO2: 100, PEEP: 8, ITime: 0.8, MAP: 12, PIP: 25    08-18 @ 07:01  -  08-19 @ 07:00  --------------------------------------------------------  IN: 1300 mL / OUT: 200 mL / NET: 1100 mL    08-19 @ 07:01  -  08-19 @ 22:41  --------------------------------------------------------  IN: 3413 mL / OUT: 875 mL / NET: 2538 mL      CAPILLARY BLOOD GLUCOSE  POCT Blood Glucose.: 186 mg/dL (19 Aug 2020 21:35)        PHYSICAL EXAM:     Constitutional: WNWD, elderly male, intubated and sedated   	Eyes: anicteric sclera, pupils dilated b/l, minimal pupillary response   	ENT: ETT in place, MMM, +OG tube   	Neck: supple; no JVD or thyromegaly  	Respiratory: CTA B/L; no W/R/R,  	Cardiac: +S1/S2; tachycardic, regular rhythm; no M/R/G;   	Gastrointestinal: abdomen soft, distended/tympanic but NT, no rebound or guarding; +BSx4, sump in place on suction  	: Chakraborty catheter in place   	Extremities: cool hands and feet, no clubbing or cyanosis; no peripheral edema, fait LE DP pulses   	Dermatologic: skin warm, dry and intact; no rashes, wounds, or scars  	Lymphatic: no submandibular or cervical LAD    Neurologic: unable to assess given sedation, does not withdraw to pain     Lines: a-line, RIJ triple lumen, [place], OG tube (08/18)       MEDICATIONS:  MEDICATIONS  (STANDING):  aMIOdarone Infusion 1 mG/Min (33.3 mL/Hr) IV Continuous <Continuous>  aMIOdarone Infusion 0.5 mG/Min (16.7 mL/Hr) IV Continuous <Continuous>  chlorhexidine 4% Liquid 1 Application(s) Topical <User Schedule>  dextrose 5%. 1000 milliLiter(s) (50 mL/Hr) IV Continuous <Continuous>  dextrose 50% Injectable 25 Gram(s) IV Push once  EPINEPHrine    Infusion 2 MICROgram(s)/kG/Min (347 mL/Hr) IV Continuous <Continuous>  fentaNYL   Infusion. 0.5 MICROgram(s)/kG/Hr (4.63 mL/Hr) IV Continuous <Continuous>  heparin 25 Units/mL (IMPELLA VAD) Purge Solution  Unit(s) (12 mL/Hr) Ventricular Assist Device <Continuous>  insulin lispro (HumaLOG) corrective regimen sliding scale   SubCutaneous Before meals and at bedtime  insulin regular Infusion 3 Unit(s)/Hr (3 mL/Hr) IV Continuous <Continuous>  lactated ringers Bolus 500 milliLiter(s) IV Bolus once  lactated ringers Bolus 500 milliLiter(s) IV Bolus once  lactated ringers Bolus 1000 milliLiter(s) IV Bolus once  lactated ringers. 1000 milliLiter(s) (100 mL/Hr) IV Continuous <Continuous>  meropenem  IVPB 2000 milliGRAM(s) IV Intermittent every 12 hours  norepinephrine Infusion 0.05 MICROgram(s)/kG/Min (4.34 mL/Hr) IV Continuous <Continuous>  propofol Infusion 10 MICROgram(s)/kG/Min (5.55 mL/Hr) IV Continuous <Continuous>  sodium bicarbonate  Infusion 2 mEq/kG/Hr (1233 mL/Hr) IV Continuous <Continuous>  sodium chloride 0.65% Nasal 2 Spray(s) Both Nostrils two times a day  sodium chloride 0.9% Bolus 250 milliLiter(s) IV Bolus once  vasopressin Infusion 0.04 Unit(s)/Min (2.4 mL/Hr) IV Continuous <Continuous>    MEDICATIONS  (PRN):  acetaminophen   Tablet .. 650 milliGRAM(s) Oral every 6 hours PRN Temp greater or equal to 38C (100.4F), Mild Pain (1 - 3)  dextrose 40% Gel 15 Gram(s) Oral once PRN Blood Glucose LESS THAN 70 milliGRAM(s)/deciliter      ALLERGIES:  Allergies    No Known Allergies    Intolerances        LABS:                        7.3    38.47 )-----------( 175      ( 19 Aug 2020 21:49 )             23.4     08-19    135  |  95<L>  |  25<H>  ----------------------------<  224<H>  5.4<H>   |  9<LL>  |  1.97<H>    Ca    8.8      19 Aug 2020 18:47    TPro  4.9<L>  /  Alb  2.1<L>  /  TBili  1.0  /  DBili  x   /  AST  2019<H>  /  ALT  1792<H>  /  AlkPhos  121<H>  08-19    PT/INR - ( 19 Aug 2020 18:47 )   PT: 26.5 sec;   INR: 2.30          PTT - ( 19 Aug 2020 21:49 )  PTT:70.5 sec      RADIOLOGY & ADDITIONAL TESTS: Reviewed.  < from: Xray Chest 1 View AP/PA (08.19.20 @ 11:09) >  INTERPRETATION:  PORTABLE CHEST X-RAY    Indication: Chest Pain    Bedside examination of the chest dated 8/19/2020 11:09 AM is compared to the previous studyof 8/16/2020.    FINDINGS: Two views of chest obtained at bedside. Study is limited by patient positioning. There is new pulmonary vascular congestion. No definite pleural effusion. Cardiomediastinal silhouette suboptimally evaluated in this projection. Degenerative changes of spine. Degenerative changes of left shoulder. Soft tissues unremarkable.    IMPRESSION: There has been development of pulmonary vascular congestion.    < end of copied text >

## 2020-08-19 NOTE — PROVIDER CONTACT NOTE (CRITICAL VALUE NOTIFICATION) - ACTION/TREATMENT ORDERED:
MD Jeronimo informed, no new orders at present
MD Jeronimo informed of result. Awaiting CCU MD to order Heparin infusion.
provider notified

## 2020-08-19 NOTE — PROVIDER CONTACT NOTE (CRITICAL VALUE NOTIFICATION) - DATE AND TIME:
19-Aug-2020 17:45 Patient called wanting to know if you were going to change her claritin to something else also?   19-Aug-2020 19:45

## 2020-08-19 NOTE — CONSULT NOTE ADULT - SUBJECTIVE AND OBJECTIVE BOX
HPI:    70M with PMH HTN, HLD, DM transferred from outside hospital for management of bleeding clival chordoma. Had endonasal biopsy of clival mass in OR on 8/18, found to have necrotic purulence and started on abx.  Overnight patient's systolic blood pressure gradually decreased from 130s to 100s.On AM rounds, patient found to be hypotensive, tachycardic to 120s with oxygen saturation in the 80s. Cardiology consulted for ST changes (found to have ST elevation in aVR and ST depression in V1-V3). Troponin of 0.75.     PMH: as above  PSH: cholecystectomy  Allergies: NKDA  SH: denies smoking, alcohol, recreational drugs    ROS: negative for chest pain, cough, SOB, fevers, chills, abdominal pain, diarrhea (16 Aug 2020 13:16)    PAST MEDICAL & SURGICAL HISTORY:      PREVIOUS DIAGNOSTIC TESTING:        FAMILY HISTORY:      ALLERGIES/INTOLERANCES:  No Known Allergies    HOME MEDICATIONS:    INPATIENT MEDICATIONS:  aMIOdarone Infusion 1 mG/Min (33.3 mL/Hr) IV Continuous <Continuous>  aMIOdarone Infusion 0.5 mG/Min (16.7 mL/Hr) IV Continuous <Continuous>  EPINEPHrine    Infusion 2 MICROgram(s)/kG/Min (347 mL/Hr) IV Continuous <Continuous>  norepinephrine Infusion 0.05 MICROgram(s)/kG/Min (4.34 mL/Hr) IV Continuous <Continuous>    cangrelor Infusion 3.892 MICROgram(s)/kG/Min IV Continuous <Continuous>  heparin 25 Units/mL (IMPELLA VAD) Purge Solution  Unit(s) Ventricular Assist Device <Continuous>    acetaminophen   Tablet .. 650 milliGRAM(s) Oral every 6 hours PRN  chlorhexidine 4% Liquid 1 Application(s) Topical <User Schedule>  dextrose 40% Gel 15 Gram(s) Oral once PRN  fentaNYL   Infusion. 0.5 MICROgram(s)/kG/Hr IV Continuous <Continuous>  insulin regular Infusion 3 Unit(s)/Hr IV Continuous <Continuous>  lactated ringers Bolus 500 milliLiter(s) IV Bolus once  lactated ringers Bolus 500 milliLiter(s) IV Bolus once  lactated ringers Bolus 1000 milliLiter(s) IV Bolus once  lactated ringers. 1000 milliLiter(s) IV Continuous <Continuous>  meropenem  IVPB 2000 milliGRAM(s) IV Intermittent every 8 hours  propofol Infusion 10 MICROgram(s)/kG/Min IV Continuous <Continuous>  sodium bicarbonate  Injectable 50 milliEquivalent(s) IV Push once  sodium bicarbonate  Injectable 50 milliEquivalent(s) IV Push once  sodium chloride 0.65% Nasal 2 Spray(s) Both Nostrils two times a day  sodium chloride 0.9% Bolus 250 milliLiter(s) IV Bolus once  vasopressin Infusion 0.04 Unit(s)/Min IV Continuous <Continuous>    REVIEW OF SYSTEMS: See HPI     PHYSICAL EXAM:  Gen: NAD   HEENT: EOMI   Cor: Normal s1, s2. RRR.   Abd: soft, non-tender, non-distended  Ext: no edema  Neuro: alert and attentive    T(C): 38.9 (08-19-20 @ 11:08), Max: 38.9 (08-19-20 @ 11:08)  HR: 110 (08-19-20 @ 17:30) (97 - 124)  BP: 134/72 (08-19-20 @ 13:10) (89/54 - 144/115)  RR: 14 (08-19-20 @ 17:30) (7 - 44)  SpO2: 98% (08-19-20 @ 17:30) (86% - 100%)  Wt(kg): --    I&O's Summary    18 Aug 2020 07:01  -  19 Aug 2020 07:00  --------------------------------------------------------  IN: 1300 mL / OUT: 200 mL / NET: 1100 mL    19 Aug 2020 07:01  -  19 Aug 2020 18:37  --------------------------------------------------------  IN: 91 mL / OUT: 400 mL / NET: -309 mL        LABS:                        8.2    41.85 )-----------( 425      ( 19 Aug 2020 17:25 )             25.9     08-19    126<L>  |  90<L>  |  23  ----------------------------<  336<H>  5.4<H>   |  19<L>  |  1.46<H>    Ca    8.3<L>      19 Aug 2020 10:36

## 2020-08-19 NOTE — CONSULT NOTE ADULT - CONSULT REQUESTED DATE/TIME
19-Aug-2020
18-Aug-2020 14:15
18-Aug-2020 20:17
19-Aug-2020 13:01
19-Aug-2020 16:04
19-Aug-2020 18:37

## 2020-08-19 NOTE — CONSULT NOTE ADULT - ATTENDING COMMENTS
I have reviewed the medical record, including laboratory and radiographic studies, interviewed and examined the patient and discussed the plan with Dr. Yee, the ID Resident.  Agree with above. Will continue to follow with you – ID Team 1.
Reviewed with JOSE LUIS Grijalva acting as my scribe. After cath lab patient will be transferred to CCU service. I have been in frequent communication with ENT service to update and they have been communicating with Dr. Felix. They are aware of need for anticoagulation/antiplatelet use and are prepared to intervene should Mr. Mccord experience bleeding from the tumor or the biopsy sites.

## 2020-08-19 NOTE — CONSULT NOTE ADULT - ASSESSMENT
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  Patient initially presented with 10/10 headache for the past year; MRI has revealed an intracranial mass (followed by Dr. Costello).  Patient underwent a transnasal biopsy showing clival chordoma.  Post procedure, patient presented with increase headache, epistaxis, nasal congestion, progressive decrease hearing with tinnitus.  Patient denies blured vision, anosmia, dysphagia, odynophagia, or throat pain.  Patient is initiated abx per ID recommendation.  This morning patient was noted to be hypotensive, tachycardiac, and received about 3L od isotonic crystalloid without resolution.  He was then placed on pressors and was placed on 100% NRBM due to hypoxic episode.  His immediately lab value revealed WBC of 24, , CKMB 26.6, troponin 0.75 and BNP 5975.  With evidence of right heart strain on EKG, patient was transferred to the ICU for further care.  Patient continued deteriorating, he was brought to the cath lab.  A diagnostic cath has revealed his severe coronary disease along with suspicious of vegetation of Mitral valve.  Patient's TTE also identified his EF of 10-15%.  CTS surgery is consulted.       Problem #1 cardiogenic shock associated with CAD  - the case has been evaluated and discussed with Dr. Lopez  - with patient's current condition, he is not a surgical candidate  - continue to follow peripherally   - medical management per ICU team and primary team    Problem #2 systolic CHF  EF 10-15%  - consider supportive device if patient tolerate     Problem #3 septic shock   - continue current care with ICU team and primary team   - ABX contibues  - supportive care and drips     Problem #4 Clival chordoma  - continue current care with neurosurgery 70y Male with PMH significant for HTN, HLD, DMT2 and bleeding clival chordoma with status post day#1 fine needle biopsy presented to CT surgery with findings of cardiogenic shock associated with CAD, mitral valve vegetation,  and systolic CHF requiring pressor supports.  Patient initially presented with 10/10 headache for the past year; MRI has revealed an intracranial mass (followed by Dr. Costello).  Patient underwent a transnasal biopsy showing clival chordoma.  Post procedure, patient presented with increase headache, epistaxis, nasal congestion, progressive decrease hearing with tinnitus.  Patient denies blured vision, anosmia, dysphagia, odynophagia, or throat pain.  Patient is initiated abx per ID recommendation.  This morning patient was noted to be hypotensive, tachycardiac, and received about 3L od isotonic crystalloid without resolution.  He was then placed on pressors and was placed on 100% NRBM due to hypoxic episode.  His immediately lab value revealed WBC of 24, , CKMB 26.6, troponin 0.75 and BNP 5975.  With evidence of right heart strain on EKG, patient was transferred to the ICU for further care.  Patient continued deteriorating, he was brought to the cath lab.  A diagnostic cath has revealed his severe coronary disease along with suspicious of vegetation of Mitral valve.  Patient's TTE also identified his EF of 10-15%.  CTS surgery is consulted.       Problem #1 cardiogenic shock associated with CAD  - the case has been evaluated and discussed with Dr. Lopez  - with patient's current condition, he is not a surgical candidate  - continue to follow peripherally   - medical management per ICU team and primary team    Problem #2 systolic CHF  EF 10-15%  - consider supportive device if patient tolerate     Problem #3 septic shock   - continue current care with ICU team and primary team   - ABX contibues  - supportive care and drips     Problem #4 Clival chordoma  - continue current care with neurosurgery    I have reviewed clinical labs tests and reports, radiology tests and reports, as well as old patient medical records, and discussed with the refering physician.

## 2020-08-19 NOTE — CONSULT NOTE ADULT - ASSESSMENT
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 Had endonasal biopsy of clival mass in OR on 8/18, found to have necrotic purulence and started on abx. Cardiology consulted for ST changes (found to have ST elevation in aVR and ST depression in V1-V3). Troponin of 0.75.     #STEMI   ST elevation in aVR concerning for left main disease vs 3 vessel disease. Troponin of 0.75. No known CAD prior to this event.   - full dose ASA 325mg, heparin gtt; deferring plavix given patient maybe a candidate for CABG given his ECG findings  - interventional cardiology consulted, planning on taking the patient to the cath lab  - fu stat TTE results; bedside TTE shows severely reduced EF    - recommend intubation prior to cath given his tenuous respiratory status    Case discussed with cardiology attending.   Ruben Arriaga MD

## 2020-08-19 NOTE — PROGRESS NOTE ADULT - SUBJECTIVE AND OBJECTIVE BOX
OVERNIGHT EVENTS: Patient persistently hypotensive and tachycardic overnight.    SUBJECTIVE / INTERVAL HPI: Patient seen and examined at bedside c CP, and SOB. Endorsing occasional nasal discharge.    Review of systems negative except as noted above.     VITAL SIGNS:  Vital Signs Last 24 Hrs  T(C): 38.9 (19 Aug 2020 11:08), Max: 38.9 (19 Aug 2020 11:08)  T(F): 102 (19 Aug 2020 11:08), Max: 102 (19 Aug 2020 11:08)  HR: 113 (19 Aug 2020 13:00) (103 - 124)  BP: 134/72 (19 Aug 2020 13:10) (89/54 - 144/115)  BP(mean): 96 (19 Aug 2020 13:10) (65 - 123)  RR: 44 (19 Aug 2020 13:00) (14 - 44)  SpO2: 100% (19 Aug 2020 13:00) (86% - 100%)      08-18-20 @ 07:01  - 08-19-20 @ 07:00  --------------------------------------------------------  IN: 1300 mL / OUT: 200 mL / NET: 1100 mL    08-19-20 @ 07:01 - 08-19-20 @ 14:50  --------------------------------------------------------  IN: 91 mL / OUT: 400 mL / NET: -309 mL        PHYSICAL EXAM:    General: Patient appears uncomfortable. W/ accessory muscle use on NRB.  HEENT: No nasal packing. No exudates or erythema  Neck: -JVD  Cardiovascular: +S1/S2; Tachycardic.  Respiratory: Decreased breath sound RLL w/ trace crackles. Accessory muscle use.   Gastrointestinal: NTND BS+4  Extremities: WWP; no edema  Vascular: 2+ radial, DP pulses B/L  Neurological: AAOx3; no focal deficits    MEDICATIONS:  MEDICATIONS  (STANDING):  fentaNYL   Infusion. 0.5 MICROgram(s)/kG/Hr (4.63 mL/Hr) IV Continuous <Continuous>  heparin  Infusion 1400 Unit(s)/Hr (14 mL/Hr) IV Continuous <Continuous>  insulin regular Infusion 3 Unit(s)/Hr (3 mL/Hr) IV Continuous <Continuous>  lactated ringers Bolus 500 milliLiter(s) IV Bolus once  lactated ringers Bolus 500 milliLiter(s) IV Bolus once  lactated ringers Bolus 1000 milliLiter(s) IV Bolus once  lactated ringers. 1000 milliLiter(s) (100 mL/Hr) IV Continuous <Continuous>  meropenem  IVPB 2000 milliGRAM(s) IV Intermittent every 8 hours  norepinephrine Infusion 0.05 MICROgram(s)/kG/Min (8.67 mL/Hr) IV Continuous <Continuous>  propofol Infusion 10 MICROgram(s)/kG/Min (5.55 mL/Hr) IV Continuous <Continuous>  sodium chloride 0.65% Nasal 2 Spray(s) Both Nostrils two times a day  traMADol 50 milliGRAM(s) Oral every 6 hours  vancomycin  IVPB 1500 milliGRAM(s) IV Intermittent every 12 hours    MEDICATIONS  (PRN):  acetaminophen   Tablet .. 650 milliGRAM(s) Oral every 6 hours PRN Temp greater or equal to 38C (100.4F), Mild Pain (1 - 3)  dextrose 40% Gel 15 Gram(s) Oral once PRN Blood Glucose LESS THAN 70 milliGRAM(s)/deciliter  ondansetron    Tablet 4 milliGRAM(s) Oral every 6 hours PRN Nausea and/or Vomiting  oxyCODONE    IR 5 milliGRAM(s) Oral every 4 hours PRN Moderate Pain (4 - 6)      ALLERGIES:  Allergies    No Known Allergies    Intolerances        LABS:                        9.4    24.09 )-----------( 507      ( 19 Aug 2020 10:37 )             28.7     08-19    126<L>  |  90<L>  |  23  ----------------------------<  336<H>  5.4<H>   |  19<L>  |  1.46<H>    Ca    8.3<L>      19 Aug 2020 10:36      PT/INR - ( 19 Aug 2020 11:39 )   PT: 16.8 sec;   INR: 1.42          PTT - ( 19 Aug 2020 11:39 )  PTT:32.7 sec    CAPILLARY BLOOD GLUCOSE      POCT Blood Glucose.: 325 mg/dL (19 Aug 2020 14:38)    CARDIAC MARKERS ( 19 Aug 2020 10:36 )  x     / 0.75 ng/mL / 317 U/L / x     / 26.6 ng/mL        RADIOLOGY & ADDITIONAL TESTS: Reviewed.

## 2020-08-19 NOTE — AIRWAY PLACEMENT NOTE ADULT - POST AIRWAY PLACEMENT ASSESSMENT:
breath sounds equal/chest excursion noted/CXR pending/breath sounds bilateral/positive end tidal CO2 noted

## 2020-08-19 NOTE — PROGRESS NOTE ADULT - ASSESSMENT
ASSESSMENT:   70M with PMH HTN, HLD, DM, who transferred from outside hospital for management of bleeding clival chordoma. Patient had an endonasal biopsy of clival mass in OR on 8/18, was found to have necrotic purulence, so was subsequently started on meropenem and vancomycin. Course complicated by chest pain in the setting of ST depressions on EKG, trop of 0.75, so now s/p cath on 08/19 and Impella-assisted PCI with stent placed to the proximal LAD. S/p cardiac arrest in cath lab and admitted to the CCU with further management of cardiogenic shock, requiring 3 pressors.     PLAN:   CARDIO:   #Cardiogenic shock 2/2 to ACS:   Patient complained of CP on 08/19 in the AM, was found to have ST depressions on EKG and a trop of 0.75, so sent to the cath lab emergently. S/p v fib cardiac arrest in cath lab and Impella-assissted stent placement.   - echo showed akinesis of the entire anterior, anterolateral, apical septal, apical inferior wall with an EF of 15-20%  - initial cardiac enzymes: trop 0.75, CK 1305, CKMB 88.6  - trend cardiac enzymes to peak   - s/p loading with aspirin and Birlinta on 08/19   - c/w Brilinta 90 mg PO BID   - c/w aspirin 81 mg PO daily   - start heparin once PTT permits (with a goal of 60-80)   - current pressor requirements: epinephrine 2mcg/kg, vasporessin 0.04 units/min, and levophed 0.05 mcg/kg  - c/w amiodarone 1mg/min for 6 hours   - titrate pressors to a goal of MAP >65   - lactate 16 upon arrival to CCU     RESPIRATORY:   #S/p intubation:   - sedated, intubated, and ventilator dependent   - current vent settings: FiO2 100%, PEEP 8, , RR 14  - c/w propofol and fentanyl for sedation     NEURO:   #Need for sedation:   - c/w propofol and fentanyl   - unable to assess neurologic status while on sedation but pupils dilated b/l     #Clival chordoma:   MRI 1 month ago, revealing an intracranial mass. He was seen by Dr. Costello, who performed a transnasal biopsy showing a clival chordoma. Went to OR on 8/18 for endoscopic endonasal CT guided biopsy of central skull base tumor, clival chordoma and control of epistaxis, found to have necrotic purulence at skull base.  - surgical swab culture of nasopharyngeal mass (08/18) - growing Proteus and Staph Aureus   - f/u susceptibilities  - c/w meropenem 2g q12 dosed for creatinine clearance per ID   - c/w vancomycin dosed by level, f/u AM level per ID     #GI:   - NPO for now   - abdomen distended and tympanic but soft on exam   - NG tube in place on on low intermittent suction to help decompress patient     #RENAL:  #HESHAM likely 2/2 to cardiogenic shock 2/2 ACS:   - s/p 250 cc bolus of NS   - strict Is and Os with cool in place   - monitor CMP and trend Cr   - renally dose all medications   - avoid nephrotoxic medications   - send urine studies     #ENDO:   #DM2:   History of DM2 and found to have an HbA1c of 9.7.   - s/p insulin infusion given FS persistently in th 300s on mISS   - FS goal of 180  - continue to monitor FS and resume infusion if necessary     ID:   #?Septic shock 2/2 purulent necrotic debris noted on nasal endoscopy:   - surgical swab culture of nasopharyngeal mass (08/18) - growing Proteus and Staph Aureus   - f/u susceptibilities  - c/w meropenem 2g q12 dosed for creatinine clearance per ID   - c/w vancomycin dosed by level, f/u AM level per ID     PROPHYLAXIS:   Fluids: none  Electrolytes: Keep K>4, Mg>2   Nutrition: NPO   DVT ppx: heparin ggt   PI ppx; none

## 2020-08-19 NOTE — PROCEDURE NOTE - NSINFORMCONSENT_GEN_A_CORE
Benefits, risks, and possible complications of procedure explained to patient/caregiver who verbalized understanding and gave verbal consent./Emergent procedure. Consent provided by patient at bedside.

## 2020-08-19 NOTE — PROGRESS NOTE ADULT - SUBJECTIVE AND OBJECTIVE BOX
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 Patient states that he was having 10/10 headaches for the past year for which he received an MRI 1 month ago, revealing an intracranial mass. He was seen by Dr. Costello, who performed a transnasal biopsy showing a clival chordoma. He experienced intermittent oral bleeding since his biopsy, which increased in volume in the past 3 days, so he presented to the hospital. He endorses headaches, 1 episode of epistaxis, nasal congestion, progressive decreased hearing with tinnitus. He denies blurry vision, anosmia, dysphagia, odynophagia, or throat pain.     8/17: No acute events overnight. Patient complains of increasing headaches, will get pain consult today. Planned for MRI brain and face today and CT head/neck for navigation. Plan for OR tomorrow or thursday. Will keep NPO with IVF tonight at midnight.  8/18: Low grade fever this AM, given tylenol. Will go to OR for endoscopic endonasal CT guided biopsy of central skull base and control of epistaxis. Sugars in the high 200s, will add lantus tonight.     08/19: Patient had endonasal biopsy of clival mass in OR yesterday, found to have necrotic purulence, so ID consulted and patient started on vanc/cefepime/flagyl. Overnight patient's systolic blood pressure gradually decreased from 130s to 100s. On AM rounds, patient found to be tachycardic to 120s with oxygen saturation in the 80s. Bilateral merocel packing removed and patient placed on face tent, which improved saturation to 95%. Patient's tachycardia persisted and patient given 2L bolus without response. Pt WBC 24, , CKMB 26.6, troponin 0.75, and BNP 5975, with evidence of right heart strain on ECG. Patient transferred to ICU and started on levophed drip. Patient maintains normal mental status. Complains of chest discomfort.     PMH: as above  PSH: cholecystectomy  Allergies: NKDA  SH: denies smoking, alcohol, recreational drugs    ROS: negative for chest pain, cough, SOB, fevers, chills, abdominal pain, diarrhea    Vital Signs Last 24 Hrs  T(C): 38.9 (19 Aug 2020 11:08), Max: 38.9 (19 Aug 2020 11:08)  T(F): 102 (19 Aug 2020 11:08), Max: 102 (19 Aug 2020 11:08)  HR: 120 (19 Aug 2020 12:15) (103 - 124)  BP: 144/115 (19 Aug 2020 11:23) (89/54 - 144/115)  BP(mean): 123 (19 Aug 2020 11:23) (65 - 123)  RR: 31 (19 Aug 2020 12:15) (14 - 34)  SpO2: 89% (19 Aug 2020 12:15) (87% - 99%)    Physical exam  Gen: AAO x 3, on face tent  Head: normocephalic, atraumatic  Face: CNs II-XII grossly intact  Eyes: EOMI, PERRL, visual acuity WNL  Nose: grossly normal externally, crusting anteriorly  Oral cavity/oropharynx: poor dentition, moist mucous membranes, tongue midline, floor of mouth soft  Neck: soft, flat, full ROM                                              9.4                   Neurophils% (auto):   79.0   (08-19 @ 10:37):    24.09)-----------(507          Lymphocytes% (auto):  13.7                                          28.7                   Eosinphils% (auto):   0.0      Manual%: Neutrophils x    ; Lymphocytes x    ; Eosinophils x    ; Bands%: x    ; Blasts x          08-19    126<L>  |  90<L>  |  x   ----------------------------<  336<H>  5.4<H>   |  19<L>  |  x     Ca    8.3<L>      19 Aug 2020 10:36      ( 08-19 @ 11:39 )   PT: 16.8 sec;   INR: 1.42   aPTT: 32.7 sec        RECENT CULTURES:  08-18 @ 19:35 .Blood Blood     No growth at 12 hours      08-18 @ 18:06 .Surgical Swab nasopharyngeal mass #1     Culture in progress    No organisms seen  Few WBC's        70M with HTN, HLD, DM with clival chordoma associated with oral cavity bleeding, currently dry, admitted for control of bleeding and presurgical management, s/p endoscopic endonasal biopsy with necrotic purulence, now with septic vs. cardiogenic shock.      -transferred to ICU for cardiac vs. septic shock management  -ENT available for control of possible epistaxis or oral cavity bleeding  -saline nasal spray BID  -medical management per ICU  -please page ENT with questions

## 2020-08-19 NOTE — PROCEDURE NOTE - NSPROCDETAILS_GEN_ALL_CORE
lumen(s) aspirated and flushed/sterile dressing applied/sterile technique, catheter placed/ultrasound guidance/guidewire recovered
positive blood return obtained via catheter/location identified, draped/prepped, sterile technique used, needle inserted/introduced/connected to a pressurized flush line/all materials/supplies accounted for at end of procedure/Seldinger technique/sutured in place/hemostasis with direct pressure, dressing applied

## 2020-08-19 NOTE — CONSULT NOTE ADULT - SUBJECTIVE AND OBJECTIVE BOX
Surgeon:     Requesting Physician:     HISTORY OF PRESENT ILLNESS:  70y Male with PMN   70M with PMH HTN, HLD, DM transferred from outside hospital for management of bleeding clival chordoma. Patient states that he was having 10/10 headaches for the past year for which he received an MRI 1 month ago, revealing an intracranial mass. He was seen by Dr. Costello, who performed a transnasal biopsy showing a clival chordoma. He experienced intermittent oral bleeding since his biopsy, which increased in volume in the past 3 days, so he presented to the hospital. He endorses headaches, 1 episode of epistaxis, nasal congestion, progressive decreased hearing with tinnitus. He denies blurry vision, anosmia, dysphagia, odynophagia, or throat pain. On biopsy purulence was noted and he was started on vanco/cefepime/flagyl by ID service. This AM he was noted to be hypotensive and received about 3 liters of isotonic crystalloid but was still hypotensive requiring pressors and began to be hypoxic requiring 100 % NRBM. On POCUS and CXR he was in pulmonary edema.    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 0.5 MICROgram(s)/kG/Hr (4.63 mL/Hr) IV Continuous <Continuous>  heparin  Infusion 1400 Unit(s)/Hr (14 mL/Hr) IV Continuous <Continuous>  insulin regular Infusion 3 Unit(s)/Hr (3 mL/Hr) IV Continuous <Continuous>  lactated ringers Bolus 500 milliLiter(s) IV Bolus once  lactated ringers Bolus 500 milliLiter(s) IV Bolus once  lactated ringers Bolus 1000 milliLiter(s) IV Bolus once  lactated ringers. 1000 milliLiter(s) (100 mL/Hr) IV Continuous <Continuous>  meropenem  IVPB 2000 milliGRAM(s) IV Intermittent every 8 hours  norepinephrine Infusion 0.05 MICROgram(s)/kG/Min (8.67 mL/Hr) IV Continuous <Continuous>  propofol Infusion 10 MICROgram(s)/kG/Min (5.55 mL/Hr) IV Continuous <Continuous>  sodium chloride 0.65% Nasal 2 Spray(s) Both Nostrils two times a day  traMADol 50 milliGRAM(s) Oral every 6 hours  vancomycin  IVPB 1500 milliGRAM(s) IV Intermittent every 12 hours    MEDICATIONS  (PRN):  acetaminophen   Tablet .. 650 milliGRAM(s) Oral every 6 hours PRN Temp greater or equal to 38C (100.4F), Mild Pain (1 - 3)  dextrose 40% Gel 15 Gram(s) Oral once PRN Blood Glucose LESS THAN 70 milliGRAM(s)/deciliter  ondansetron    Tablet 4 milliGRAM(s) Oral every 6 hours PRN Nausea and/or Vomiting  oxyCODONE    IR 5 milliGRAM(s) Oral every 4 hours PRN Moderate Pain (4 - 6)      Allergies    No Known Allergies    Intolerances        SOCIAL HISTORY:  Smoker:   ETOH use:   Ilicit Drug use:   Occupation:  Assisted device use (Cane / Walker):  Live with:    FAMILY HISTORY:      Review of Systems  CONSTITUTIONAL:  Fevers / chills, sweats, fatigue, weight loss, weight gain                                      NEURO:  parathesias, seizures, syncope, confusion                                                                                 EYES:  Blurry vision, discharge, pain, loss of vision                                                                                    ENMT:  Difficulty hearing, vertigo, dysphagia, epistaxis, recent dental work                                      CV:  Chest pain, palpitations, JOHNSON, orthopnea                                                                                           RESPIRATORY:  Wheezing, SOB, cough / sputum, hemoptysis                                                              GI:  Nausea, vommiting, diarrhea, constipation, melena                                                                         : Hematuria, dysuria, urgency, incontinence                                                                                      MUSKULOSKELETAL:  arthritis, joint swelling, muscle weakness                                                            SKIN/BREAST:  rash, itching, sam loss, masses                                                                                             PSYCH:  depresion, anxiety, suicidal ideation                                                                                               HEME/LYMPH:  bruises easily, enlarged lymph nodes, tender lymph nodes                                          ENDOCRINE:  cold intolerance, heat intolerance, polydipsia                                                                  PHYSICAL EXAM  Vital Signs Last 24 Hrs  T(C): 38.9 (19 Aug 2020 11:08), Max: 38.9 (19 Aug 2020 11:08)  T(F): 102 (19 Aug 2020 11:08), Max: 102 (19 Aug 2020 11:08)  HR: 113 (19 Aug 2020 13:00) (103 - 124)  BP: 134/72 (19 Aug 2020 13:10) (89/54 - 144/115)  BP(mean): 96 (19 Aug 2020 13:10) (65 - 123)  RR: 44 (19 Aug 2020 13:00) (14 - 44)  SpO2: 100% (19 Aug 2020 13:00) (86% - 100%)    CONSTITUTIONAL:                                                                          NEURO:                                                                                                         EYES:                                                                                                  ENMT:                                                                                                CV:                                                                                                       RESPIRATORY:                                                                                   GI:                                                                                                       : YATES + / -                                                                                 MUSKULOSKELETAL:                                                              SKIN / BREAST:                                                                                                                                         LABS:                        9.4    24.09 )-----------( 507      ( 19 Aug 2020 10:37 )             28.7     08-19    126<L>  |  90<L>  |  23  ----------------------------<  336<H>  5.4<H>   |  19<L>  |  1.46<H>    Ca    8.3<L>      19 Aug 2020 10:36      PT/INR - ( 19 Aug 2020 11:39 )   PT: 16.8 sec;   INR: 1.42          PTT - ( 19 Aug 2020 11:39 )  PTT:32.7 sec    CARDIAC MARKERS ( 19 Aug 2020 10:36 )  x     / 0.75 ng/mL / 317 U/L / x     / 26.6 ng/mL      RADIOLOGY & ADDITIONAL STUDIES:  CAROTID U/S:    CXR:    CT Scan:    EKG:    TTE / PHYLLIS:    Cardiac Cath: Surgeon:     Requesting Physician:     HISTORY OF PRESENT ILLNESS:  70y Male with PMH significant for HTN, HLD, DMT2 and bleeding clival chordoma with status post day#1 fine needle biopsy presented to CT surgery with findings of cardiogenic shock associated with CAD, mitral valve vegetation,  and systolic CHF requiring pressor supports.  Patient initially presented with 10/10 headache for the past year; MRI has revealed an intracranial mass (followed by Dr. Costello).  Patient underwent a transnasal biopsy showing clival chordoma.  Post procedure, patient presented with increase headache, epistaxis, nasal congestion, progressive decrease hearing with tinnitus.  Patient denies blured vision, anosmia, dysphagia, odynophagia, or throat pain.      On biopsy purulence was noted and he was started on vanco/cefepime/flagyl by ID service. This AM he was noted to be hypotensive and received about 3 liters of isotonic crystalloid but was still hypotensive requiring pressors and began to be hypoxic requiring 100 % NRBM. On POCUS and CXR he was in pulmonary edema.    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 0.5 MICROgram(s)/kG/Hr (4.63 mL/Hr) IV Continuous <Continuous>  heparin  Infusion 1400 Unit(s)/Hr (14 mL/Hr) IV Continuous <Continuous>  insulin regular Infusion 3 Unit(s)/Hr (3 mL/Hr) IV Continuous <Continuous>  lactated ringers Bolus 500 milliLiter(s) IV Bolus once  lactated ringers Bolus 500 milliLiter(s) IV Bolus once  lactated ringers Bolus 1000 milliLiter(s) IV Bolus once  lactated ringers. 1000 milliLiter(s) (100 mL/Hr) IV Continuous <Continuous>  meropenem  IVPB 2000 milliGRAM(s) IV Intermittent every 8 hours  norepinephrine Infusion 0.05 MICROgram(s)/kG/Min (8.67 mL/Hr) IV Continuous <Continuous>  propofol Infusion 10 MICROgram(s)/kG/Min (5.55 mL/Hr) IV Continuous <Continuous>  sodium chloride 0.65% Nasal 2 Spray(s) Both Nostrils two times a day  traMADol 50 milliGRAM(s) Oral every 6 hours  vancomycin  IVPB 1500 milliGRAM(s) IV Intermittent every 12 hours    MEDICATIONS  (PRN):  acetaminophen   Tablet .. 650 milliGRAM(s) Oral every 6 hours PRN Temp greater or equal to 38C (100.4F), Mild Pain (1 - 3)  dextrose 40% Gel 15 Gram(s) Oral once PRN Blood Glucose LESS THAN 70 milliGRAM(s)/deciliter  ondansetron    Tablet 4 milliGRAM(s) Oral every 6 hours PRN Nausea and/or Vomiting  oxyCODONE    IR 5 milliGRAM(s) Oral every 4 hours PRN Moderate Pain (4 - 6)      Allergies    No Known Allergies    Intolerances        SOCIAL HISTORY:  Smoker:   ETOH use:   Ilicit Drug use:   Occupation:  Assisted device use (Cane / Walker):  Live with:    FAMILY HISTORY:      Review of Systems  CONSTITUTIONAL:  Fevers / chills, sweats, fatigue, weight loss, weight gain                                      NEURO:  parathesias, seizures, syncope, confusion                                                                                 EYES:  Blurry vision, discharge, pain, loss of vision                                                                                    ENMT:  Difficulty hearing, vertigo, dysphagia, epistaxis, recent dental work                                      CV:  Chest pain, palpitations, JOHNSON, orthopnea                                                                                           RESPIRATORY:  Wheezing, SOB, cough / sputum, hemoptysis                                                              GI:  Nausea, vommiting, diarrhea, constipation, melena                                                                         : Hematuria, dysuria, urgency, incontinence                                                                                      MUSKULOSKELETAL:  arthritis, joint swelling, muscle weakness                                                            SKIN/BREAST:  rash, itching, sam loss, masses                                                                                             PSYCH:  depresion, anxiety, suicidal ideation                                                                                               HEME/LYMPH:  bruises easily, enlarged lymph nodes, tender lymph nodes                                          ENDOCRINE:  cold intolerance, heat intolerance, polydipsia                                                                  PHYSICAL EXAM  Vital Signs Last 24 Hrs  T(C): 38.9 (19 Aug 2020 11:08), Max: 38.9 (19 Aug 2020 11:08)  T(F): 102 (19 Aug 2020 11:08), Max: 102 (19 Aug 2020 11:08)  HR: 113 (19 Aug 2020 13:00) (103 - 124)  BP: 134/72 (19 Aug 2020 13:10) (89/54 - 144/115)  BP(mean): 96 (19 Aug 2020 13:10) (65 - 123)  RR: 44 (19 Aug 2020 13:00) (14 - 44)  SpO2: 100% (19 Aug 2020 13:00) (86% - 100%)    CONSTITUTIONAL:                                                                          NEURO:                                                                                                         EYES:                                                                                                  ENMT:                                                                                                CV:                                                                                                       RESPIRATORY:                                                                                   GI:                                                                                                       : YATES + / -                                                                                 MUSKULOSKELETAL:                                                              SKIN / BREAST:                                                                                                                                         LABS:                        9.4    24.09 )-----------( 507      ( 19 Aug 2020 10:37 )             28.7     08-19    126<L>  |  90<L>  |  23  ----------------------------<  336<H>  5.4<H>   |  19<L>  |  1.46<H>    Ca    8.3<L>      19 Aug 2020 10:36      PT/INR - ( 19 Aug 2020 11:39 )   PT: 16.8 sec;   INR: 1.42          PTT - ( 19 Aug 2020 11:39 )  PTT:32.7 sec    CARDIAC MARKERS ( 19 Aug 2020 10:36 )  x     / 0.75 ng/mL / 317 U/L / x     / 26.6 ng/mL      RADIOLOGY & ADDITIONAL STUDIES:  CAROTID U/S:    CXR:    CT Scan:    EKG:    TTE / PHYLLIS:    Cardiac Cath: Surgeon: MD Jessica     Requesting Physician: MD Tanya    HISTORY OF PRESENT ILLNESS:  70y Male with PMH significant for HTN, HLD, DMT2 and bleeding clival chordoma with status post day#1 fine needle biopsy presented to CT surgery with findings of cardiogenic shock associated with CAD, mitral valve vegetation,  and systolic CHF requiring pressor supports.  Patient initially presented with 10/10 headache for the past year; MRI has revealed an intracranial mass (followed by Dr. Costello).  Patient underwent a transnasal biopsy showing clival chordoma.  Post procedure, patient presented with increase headache, epistaxis, nasal congestion, progressive decrease hearing with tinnitus.  Patient denies blured vision, anosmia, dysphagia, odynophagia, or throat pain.  Patient is initiated abx per ID recommendation.  This morning patient was noted to be hypotensive, tachycardiac, and received about 3L od isotonic crystalloid without resolution.  He was then placed on pressors and was placed on 100% NRBM due to hypoxic episode.  His immediately lab value revealed WBC of 24, , CKMB 26.6, troponin 0.75 and BNP 5975.  With evidence of right heart strain on EKG, patient was transferred to the ICU for further care.  Patient continued deteriorating, he was brought to the cath lab.  A diagnostic cath has revealed his severe coronary disease along with suspicious of vegetation of Mitral valve.  Patient's TTE also identified his EF of 10-15%.  CTS surgery is consulted.       PAST MEDICAL & SURGICAL HISTORY:      MEDICATIONS  (STANDING):  fentaNYL   Infusion. 0.5 MICROgram(s)/kG/Hr (4.63 mL/Hr) IV Continuous <Continuous>  heparin  Infusion 1400 Unit(s)/Hr (14 mL/Hr) IV Continuous <Continuous>  insulin regular Infusion 3 Unit(s)/Hr (3 mL/Hr) IV Continuous <Continuous>  lactated ringers Bolus 500 milliLiter(s) IV Bolus once  lactated ringers Bolus 500 milliLiter(s) IV Bolus once  lactated ringers Bolus 1000 milliLiter(s) IV Bolus once  lactated ringers. 1000 milliLiter(s) (100 mL/Hr) IV Continuous <Continuous>  meropenem  IVPB 2000 milliGRAM(s) IV Intermittent every 8 hours  norepinephrine Infusion 0.05 MICROgram(s)/kG/Min (8.67 mL/Hr) IV Continuous <Continuous>  propofol Infusion 10 MICROgram(s)/kG/Min (5.55 mL/Hr) IV Continuous <Continuous>  sodium chloride 0.65% Nasal 2 Spray(s) Both Nostrils two times a day  traMADol 50 milliGRAM(s) Oral every 6 hours  vancomycin  IVPB 1500 milliGRAM(s) IV Intermittent every 12 hours    MEDICATIONS  (PRN):  acetaminophen   Tablet .. 650 milliGRAM(s) Oral every 6 hours PRN Temp greater or equal to 38C (100.4F), Mild Pain (1 - 3)  dextrose 40% Gel 15 Gram(s) Oral once PRN Blood Glucose LESS THAN 70 milliGRAM(s)/deciliter  ondansetron    Tablet 4 milliGRAM(s) Oral every 6 hours PRN Nausea and/or Vomiting  oxyCODONE    IR 5 milliGRAM(s) Oral every 4 hours PRN Moderate Pain (4 - 6)      Allergies    No Known Allergies    Intolerances        SOCIAL HISTORY: no able to provide complete histroy        FAMILY HISTORY: no able to provide       Review of Systems: per chart and care givers  CONSTITUTIONAL: denies Fevers / chills, sweats, fatigue, weight loss, weight gain                                      NEURO: positive for headache denies parathesias, seizures, syncope, confusion                                                                                 EYES: denies Blurry vision, discharge, pain, loss of vision                                                                                    ENMT: positive for epistaxis and headache, denies Difficulty hearing, vertigo, dysphagia,  recent dental work                                      CV: hypotensive, tachycardiac and cardiogenic shock denies Chest pain, palpitations, JOHNSON, orthopnea                                                                                           RESPIRATORY: denies  Wheezing, SOB, cough / sputum, hemoptysis                                                              GI:  denies Nausea, vommiting, diarrhea, constipation, melena                                                                         : denies Hematuria, dysuria, urgency, incontinence                                                                                      MUSKULOSKELETAL: denies arthritis, joint swelling, muscle weakness                                                            SKIN/BREAST: denie rash, itching, hair loss, masses                                                                                             PSYCH: denies depression, anxiety, suicidal ideation                                                                                               HEME/LYMPH: denies bruises easily, enlarged lymph nodes, tender lymph nodes                                          ENDOCRINE: denies cold intolerance, heat intolerance, polydipsia                                                                  PHYSICAL EXAM  Vital Signs Last 24 Hrs  T(C): 38.9 (19 Aug 2020 11:08), Max: 38.9 (19 Aug 2020 11:08)  T(F): 102 (19 Aug 2020 11:08), Max: 102 (19 Aug 2020 11:08)  HR: 113 (19 Aug 2020 13:00) (103 - 124)  BP: 134/72 (19 Aug 2020 13:10) (89/54 - 144/115)  BP(mean): 96 (19 Aug 2020 13:10) (65 - 123)  RR: 44 (19 Aug 2020 13:00) (14 - 44)  SpO2: 100% (19 Aug 2020 13:00) (86% - 100%)    CONSTITUTIONAL:  sedated                                                                         NEURO:    sedated                                                                                                      EYES:   can't examine                                                                                                ENMT:   can't examine                                                                                              CV:  tachycardiac.  hypotensive.                                                                                                       RESPIRATORY: on ventilation                                                                                GI: no bs and no bm.  soft                                                                                                       : YATES +                                                                                 MUSKULOSKELETAL:    can't examine                                                           SKIN / BREAST:   warm                                                                                                                                       LABS:                        9.4    24.09 )-----------( 507      ( 19 Aug 2020 10:37 )             28.7     08-19    126<L>  |  90<L>  |  23  ----------------------------<  336<H>  5.4<H>   |  19<L>  |  1.46<H>    Ca    8.3<L>      19 Aug 2020 10:36      PT/INR - ( 19 Aug 2020 11:39 )   PT: 16.8 sec;   INR: 1.42   PTT - ( 19 Aug 2020 11:39 )  PTT:32.7 sec    CARDIAC MARKERS ( 19 Aug 2020 10:36 )  x     / 0.75 ng/mL / 317 U/L / x     / 26.6 ng/mL      RADIOLOGY & ADDITIONAL STUDIES:  CAROTID U/S:  n/a  CXR:  < from: Xray Chest 1 View AP/PA (08.19.20 @ 11:09) >  FINDINGS: Two views of chest obtained at bedside. Study is limited by patient positioning. There is new pulmonary vascular congestion. No definite pleural effusion. Cardiomediastinal silhouette suboptimally evaluated in this projection. Degenerative changes of spine. Degenerative changes of left shoulder. Soft tissues unremarkable.    IMPRESSION: There has been development of pulmonary vascular congestion.    CT Scan:  < from: CT Head No Cont (08.17.20 @ 10:20) >  Navigational CT exam of the brain. No acute intracranial finding.    Bulky soft tissue fullness of the nasopharynx with cortical erosion of the ventral clivus. See MRI for further detail of soft tissue. Left maxillary sinus total opacification, possibly odontogenic in etiology.    EKG:  < from: 12 Lead ECG (08.19.20 @ 08:38) >    Ventricular Rate 124 BPM    Atrial Rate 124 BPM    P-R Interval 126 ms    QRS Duration 88 ms    Q-T Interval 336 ms    QTC Calculation(Bezet) 482 ms    P Axis 69 degrees    R Axis -17 degrees    T Axis 195 degrees    Diagnosis Line Sinus tachycardia  premature ventricular complexes  Marked ST abnormality, possible anterior subendocardial injury    TTE / PHYLLIS:  < from: TTE Echo Complete w/o Contrast w/ Doppler (08.19.20 @ 12:07) >   1. There is moderate concentric left ventricular hypertrophy. The entire anterior, anterolateral, apical septal, apical inferior and true apex are akinetic. Left ventricular ejection fraction is 15-20%.   2. The right ventricle is normal in size. Right ventricular systolic function is reduced.   3. The mitral valve is thickened with a portion of the anterior leaflet measuring 1.4 cm x 0.6 cm. Portions of the leaflet appear to have erratic motion. This is most suspicious for endocarditis in the right clinical scenario. There is no evidence of torn chordae or papillary muscle rupture, but chordae are prominent and redundant.   4. The aortic valve is not well visualized.   5. No pericardial effusion.    Cardiac Cath:  coronary disease left dominant  vegetation of Mitral valve   EF 10-15%

## 2020-08-19 NOTE — CONSULT NOTE ADULT - ASSESSMENT
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    Neuro: tylenol, zofran prn, oxycodone prn, tramadol scheduled now on propofol for sedation.  CV: hypotensive on levophed gtt MAP goal 65; central line and arterial line placed emergently with informed consent obtained; echo by cardiology c/w severe LV dysfunction with questionable vegetation or calcification on MV. Stat cardiology consult and plan was to go to cath lab for angiography and possible PHYLLIS. Postintubation with etomidate and then propofol for sedation his pressor needs increased. Follow UO, central venous saturation and lactates  Pulm: Intubated /100/14/8 given tenuous breathing to stabilize for cath lab.   GI/FEN: NPO  : Chakraborty  ID: Vanco (8/18-), Scott (8/19-) ID following // dc'd cefipime (8/18-19), flagyl (8/18-19) given that tumor was likely necrotic and infected acting as abscess. ID following.  Endo: insulin gtt  Heme: STEMI: heparin gtt ptt goal 60-80, asa 81, plavix 75 (dual antiplatelet loaded 8/19)  PPX: SCDs  Lines: RIJ TLC (8/19-), Theriot (8/19-)   Wounds: skull base tumor biopsy in nasopharynx  PT/OT: not ordered

## 2020-08-19 NOTE — PROVIDER CONTACT NOTE (CHANGE IN STATUS NOTIFICATION) - SITUATION
Patient arrived from 7L, alert, appeared orientated x3. Appeared diaphoretic, pale. Increased work of breathing, oxygen saturations 89% on 15L non rebreather.

## 2020-08-20 NOTE — DISCHARGE NOTE FOR THE EXPIRED PATIENT - FINDINGS/TREATMENT
Complete occlusion of LAD, L. Circumflex and Obtuse Marginal. A stent was placed in the proximal LAD and an impella device was placed.

## 2020-08-20 NOTE — PROVIDER CONTACT NOTE (CHANGE IN STATUS NOTIFICATION) - ASSESSMENT
3am BP started to go down. Epinephrine and vasopressin drips on max dose. MD Zaldivar ordered to increase. CVP 25, no fluids ordered.   3:21am: Pt on PEA. Code blue called. Followed ACLS algorithm( see code sheet).   3:45 am. pt announced  by MD Miles and MD Zaldivar. Asystole-no pulse, no gag reflex, no corneal reflex, no breathing and both pupils dilated. Family was advised by MD Zaldivar. Post mortem care done.
Temp 102.7F, Heart rate 120bpm- sinus tachycardia,  MAP 60-65. Pale, diaphoretic, weak pulse. Shortness of breath. . Saturations o2 89% on 15 L non rebreather.

## 2020-08-20 NOTE — DISCHARGE NOTE FOR THE EXPIRED PATIENT - HOSPITAL COURSE
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 Patient had an endonasal biopsy of clival mass in OR on 8/18, was found to have necrotic purulence, so was subsequently started on meropenem/vancomycin after initially being on ceftiraxone/flagy Overnight, patient's systolic blood pressure gradually decreased from 130s to 100s. On AM rounds, patient was found to be hypotensive, tachycardic to 120s with oxygen saturation in the 80s. Cardiology was  consulted for ST changes (found to have ST elevation in aVR and ST depression in V1-V3) and troponin of 0.75. Heparin ggt was started, patient was loaded with aspirin and later Brilinta. Central line and a-line were placed. Echo showed evidence of global hypokinesis and an EF of 15%. Patient became less responsive so was intubated in cath lab and a PCI was performed. Found to have extensive CAD and occlusion of the LAD, so a stent was placed in the proximal to mid LAD with an Impella assistive device. Patient had a cardiac arrest in cath lab but ROSC was achieved after 4 shocks. Patient arrived to CCU intubated and sedated on propofol and fentanyl, on amiodarone, epinephrine, vasopressin, and levophed. Lactate was 16. Patient received 2 amps of bicarb and 250 cc bolus of NS. Was dosed for meropenem and vancomycin.  In the CCU, ~15 ampules of bicarbonate were given and a bicarbonate drip was started. Patient arrested due to cardiogenic and septic shock 2/2 ACS and necrotic tissue at site of clival chordoma biopsy.

## 2020-08-21 LAB
CULTURE RESULTS: SIGNIFICANT CHANGE UP
SPECIMEN SOURCE: SIGNIFICANT CHANGE UP

## 2020-08-22 LAB
-  CEFTRIAXONE: SIGNIFICANT CHANGE UP
-  CLINDAMYCIN: SIGNIFICANT CHANGE UP
-  ERYTHROMYCIN: SIGNIFICANT CHANGE UP
-  LEVOFLOXACIN: SIGNIFICANT CHANGE UP
-  PENICILLIN: SIGNIFICANT CHANGE UP
-  VANCOMYCIN: SIGNIFICANT CHANGE UP
CULTURE RESULTS: SIGNIFICANT CHANGE UP
METHOD TYPE: SIGNIFICANT CHANGE UP
METHOD TYPE: SIGNIFICANT CHANGE UP
ORGANISM # SPEC MICROSCOPIC CNT: SIGNIFICANT CHANGE UP
SPECIMEN SOURCE: SIGNIFICANT CHANGE UP

## 2020-08-23 LAB
CULTURE RESULTS: SIGNIFICANT CHANGE UP
CULTURE RESULTS: SIGNIFICANT CHANGE UP
SPECIMEN SOURCE: SIGNIFICANT CHANGE UP
SPECIMEN SOURCE: SIGNIFICANT CHANGE UP

## 2020-08-27 LAB — SURGICAL PATHOLOGY STUDY: SIGNIFICANT CHANGE UP

## 2020-09-02 PROCEDURE — 83615 LACTATE (LD) (LDH) ENZYME: CPT

## 2020-09-02 PROCEDURE — C1724: CPT

## 2020-09-02 PROCEDURE — 82550 ASSAY OF CK (CPK): CPT

## 2020-09-02 PROCEDURE — 86850 RBC ANTIBODY SCREEN: CPT

## 2020-09-02 PROCEDURE — C1760: CPT

## 2020-09-02 PROCEDURE — C1725: CPT

## 2020-09-02 PROCEDURE — 71045 X-RAY EXAM CHEST 1 VIEW: CPT

## 2020-09-02 PROCEDURE — 88360 TUMOR IMMUNOHISTOCHEM/MANUAL: CPT

## 2020-09-02 PROCEDURE — 85027 COMPLETE CBC AUTOMATED: CPT

## 2020-09-02 PROCEDURE — 88331 PATH CONSLTJ SURG 1 BLK 1SPC: CPT

## 2020-09-02 PROCEDURE — C9460: CPT

## 2020-09-02 PROCEDURE — 87186 SC STD MICRODIL/AGAR DIL: CPT

## 2020-09-02 PROCEDURE — 84100 ASSAY OF PHOSPHORUS: CPT

## 2020-09-02 PROCEDURE — 86923 COMPATIBILITY TEST ELECTRIC: CPT

## 2020-09-02 PROCEDURE — 87075 CULTR BACTERIA EXCEPT BLOOD: CPT

## 2020-09-02 PROCEDURE — 88304 TISSUE EXAM BY PATHOLOGIST: CPT

## 2020-09-02 PROCEDURE — 83036 HEMOGLOBIN GLYCOSYLATED A1C: CPT

## 2020-09-02 PROCEDURE — 86803 HEPATITIS C AB TEST: CPT

## 2020-09-02 PROCEDURE — 85730 THROMBOPLASTIN TIME PARTIAL: CPT

## 2020-09-02 PROCEDURE — 87040 BLOOD CULTURE FOR BACTERIA: CPT

## 2020-09-02 PROCEDURE — 82962 GLUCOSE BLOOD TEST: CPT

## 2020-09-02 PROCEDURE — 88305 TISSUE EXAM BY PATHOLOGIST: CPT

## 2020-09-02 PROCEDURE — C1769: CPT

## 2020-09-02 PROCEDURE — 84295 ASSAY OF SERUM SODIUM: CPT

## 2020-09-02 PROCEDURE — 83605 ASSAY OF LACTIC ACID: CPT

## 2020-09-02 PROCEDURE — 83735 ASSAY OF MAGNESIUM: CPT

## 2020-09-02 PROCEDURE — 86901 BLOOD TYPING SEROLOGIC RH(D): CPT

## 2020-09-02 PROCEDURE — 88311 DECALCIFY TISSUE: CPT

## 2020-09-02 PROCEDURE — 88365 INSITU HYBRIDIZATION (FISH): CPT

## 2020-09-02 PROCEDURE — 71046 X-RAY EXAM CHEST 2 VIEWS: CPT

## 2020-09-02 PROCEDURE — C1894: CPT

## 2020-09-02 PROCEDURE — 84132 ASSAY OF SERUM POTASSIUM: CPT

## 2020-09-02 PROCEDURE — 36415 COLL VENOUS BLD VENIPUNCTURE: CPT

## 2020-09-02 PROCEDURE — 85379 FIBRIN DEGRADATION QUANT: CPT

## 2020-09-02 PROCEDURE — 83880 ASSAY OF NATRIURETIC PEPTIDE: CPT

## 2020-09-02 PROCEDURE — 88307 TISSUE EXAM BY PATHOLOGIST: CPT

## 2020-09-02 PROCEDURE — 80048 BASIC METABOLIC PNL TOTAL CA: CPT

## 2020-09-02 PROCEDURE — 82330 ASSAY OF CALCIUM: CPT

## 2020-09-02 PROCEDURE — 70542 MRI ORBIT/FACE/NECK W/DYE: CPT

## 2020-09-02 PROCEDURE — 87184 SC STD DISK METHOD PER PLATE: CPT

## 2020-09-02 PROCEDURE — 70450 CT HEAD/BRAIN W/O DYE: CPT

## 2020-09-02 PROCEDURE — 93308 TTE F-UP OR LMTD: CPT

## 2020-09-02 PROCEDURE — C1887: CPT

## 2020-09-02 PROCEDURE — 84484 ASSAY OF TROPONIN QUANT: CPT

## 2020-09-02 PROCEDURE — 87070 CULTURE OTHR SPECIMN AEROBIC: CPT

## 2020-09-02 PROCEDURE — 82553 CREATINE MB FRACTION: CPT

## 2020-09-02 PROCEDURE — 85025 COMPLETE CBC W/AUTO DIFF WBC: CPT

## 2020-09-02 PROCEDURE — C1889: CPT

## 2020-09-02 PROCEDURE — 88341 IMHCHEM/IMCYTCHM EA ADD ANTB: CPT

## 2020-09-02 PROCEDURE — 36430 TRANSFUSION BLD/BLD COMPNT: CPT

## 2020-09-02 PROCEDURE — 80202 ASSAY OF VANCOMYCIN: CPT

## 2020-09-02 PROCEDURE — 88342 IMHCHEM/IMCYTCHM 1ST ANTB: CPT

## 2020-09-02 PROCEDURE — 87635 SARS-COV-2 COVID-19 AMP PRB: CPT

## 2020-09-02 PROCEDURE — C1874: CPT

## 2020-09-02 PROCEDURE — 85610 PROTHROMBIN TIME: CPT

## 2020-09-02 PROCEDURE — 82803 BLOOD GASES ANY COMBINATION: CPT

## 2020-09-02 PROCEDURE — 87641 MR-STAPH DNA AMP PROBE: CPT

## 2020-09-02 PROCEDURE — P9016: CPT

## 2020-09-02 PROCEDURE — 93306 TTE W/DOPPLER COMPLETE: CPT

## 2020-09-02 PROCEDURE — 93005 ELECTROCARDIOGRAM TRACING: CPT

## 2020-09-02 PROCEDURE — 80053 COMPREHEN METABOLIC PANEL: CPT

## 2020-09-02 PROCEDURE — A9585: CPT

## 2020-09-02 PROCEDURE — 85384 FIBRINOGEN ACTIVITY: CPT

## 2020-09-02 PROCEDURE — A9579: CPT

## 2024-02-21 NOTE — PATIENT PROFILE ADULT - NSTRANSFERBELONGINGSRESP_GEN_A_NUR
yes Bed/Stretcher in lowest position, wheels locked, appropriate side rails in place/Call bell, personal items and telephone in reach/Instruct patient to call for assistance before getting out of bed/chair/stretcher/Non-slip footwear applied when patient is off stretcher/[place] to call system/Physically safe environment - no spills, clutter or unnecessary equipment/Purposeful proactive rounding/Room/bathroom lighting operational, light cord in reach